# Patient Record
Sex: MALE | Race: BLACK OR AFRICAN AMERICAN | Employment: OTHER | URBAN - METROPOLITAN AREA
[De-identification: names, ages, dates, MRNs, and addresses within clinical notes are randomized per-mention and may not be internally consistent; named-entity substitution may affect disease eponyms.]

---

## 2017-07-18 ENCOUNTER — HOSPITAL ENCOUNTER (OUTPATIENT)
Dept: LAB | Age: 62
Discharge: HOME | End: 2017-07-18
Attending: INTERNAL MEDICINE
Payer: COMMERCIAL

## 2017-07-18 DIAGNOSIS — I50.22 SYSTOLIC CHF, CHRONIC (HCC): ICD-10-CM

## 2017-07-18 LAB
ANION GAP BLD CALC-SCNC: 9 MMOL/L
BASOPHILS # BLD AUTO: 0 K/UL (ref 0–0.2)
BASOPHILS # BLD: 0 % (ref 0–2)
BUN SERPL-MCNC: 23 MG/DL (ref 8–23)
CALCIUM SERPL-MCNC: 9.6 MG/DL (ref 8.3–10.4)
CHLORIDE SERPL-SCNC: 106 MMOL/L (ref 98–107)
CO2 SERPL-SCNC: 28 MMOL/L (ref 21–32)
CREAT SERPL-MCNC: 1.8 MG/DL (ref 0.8–1.5)
DIFFERENTIAL METHOD BLD: ABNORMAL
EOSINOPHIL # BLD: 0.1 K/UL (ref 0–0.8)
EOSINOPHIL NFR BLD: 2 % (ref 0.5–7.8)
ERYTHROCYTE [DISTWIDTH] IN BLOOD BY AUTOMATED COUNT: 12.9 % (ref 11.9–14.6)
EST. AVERAGE GLUCOSE BLD GHB EST-MCNC: 134 MG/DL
GLUCOSE SERPL-MCNC: 148 MG/DL (ref 65–100)
HBA1C MFR BLD: 6.3 % (ref 4.8–6)
HCT VFR BLD AUTO: 40.2 % (ref 41.1–50.3)
HGB BLD-MCNC: 14.2 G/DL (ref 13.6–17.2)
LYMPHOCYTES # BLD AUTO: 29 % (ref 13–44)
LYMPHOCYTES # BLD: 1.4 K/UL (ref 0.5–4.6)
MAGNESIUM SERPL-MCNC: 2.3 MG/DL (ref 1.8–2.4)
MCH RBC QN AUTO: 35.6 PG (ref 26.1–32.9)
MCHC RBC AUTO-ENTMCNC: 35.3 G/DL (ref 31.4–35)
MCV RBC AUTO: 100.8 FL (ref 79.6–97.8)
MONOCYTES # BLD: 0.5 K/UL (ref 0.1–1.3)
MONOCYTES NFR BLD AUTO: 11 % (ref 4–12)
NEUTS SEG # BLD: 2.8 K/UL (ref 1.7–8.2)
NEUTS SEG NFR BLD AUTO: 58 % (ref 43–78)
PLATELET # BLD AUTO: 133 K/UL (ref 150–450)
PMV BLD AUTO: 9.2 FL (ref 10.8–14.1)
POTASSIUM SERPL-SCNC: 3.7 MMOL/L (ref 3.5–5.1)
RBC # BLD AUTO: 3.99 M/UL (ref 4.23–5.67)
SODIUM SERPL-SCNC: 143 MMOL/L (ref 136–145)
WBC # BLD AUTO: 4.7 K/UL (ref 4.3–11.1)

## 2017-07-18 PROCEDURE — 85025 COMPLETE CBC W/AUTO DIFF WBC: CPT | Performed by: INTERNAL MEDICINE

## 2017-07-18 PROCEDURE — 83036 HEMOGLOBIN GLYCOSYLATED A1C: CPT | Performed by: INTERNAL MEDICINE

## 2017-07-18 PROCEDURE — 80048 BASIC METABOLIC PNL TOTAL CA: CPT | Performed by: INTERNAL MEDICINE

## 2017-07-18 PROCEDURE — 36415 COLL VENOUS BLD VENIPUNCTURE: CPT | Performed by: INTERNAL MEDICINE

## 2017-07-18 PROCEDURE — 83735 ASSAY OF MAGNESIUM: CPT | Performed by: INTERNAL MEDICINE

## 2017-07-24 ENCOUNTER — HOSPITAL ENCOUNTER (OUTPATIENT)
Dept: CARDIAC CATH/INVASIVE PROCEDURES | Age: 62
Discharge: HOME | End: 2017-07-24
Payer: COMMERCIAL

## 2017-07-24 ENCOUNTER — ANESTHESIA (OUTPATIENT)
Dept: SURGERY | Age: 62
End: 2017-07-24
Payer: COMMERCIAL

## 2017-07-24 ENCOUNTER — ANESTHESIA EVENT (OUTPATIENT)
Dept: SURGERY | Age: 62
End: 2017-07-24
Payer: COMMERCIAL

## 2017-07-24 ENCOUNTER — HOSPITAL ENCOUNTER (OUTPATIENT)
Age: 62
Setting detail: OUTPATIENT SURGERY
Discharge: HOME | End: 2017-07-24
Attending: INTERNAL MEDICINE | Admitting: INTERNAL MEDICINE
Payer: COMMERCIAL

## 2017-07-24 VITALS
TEMPERATURE: 97.6 F | BODY MASS INDEX: 29.49 KG/M2 | RESPIRATION RATE: 16 BRPM | OXYGEN SATURATION: 98 % | DIASTOLIC BLOOD PRESSURE: 94 MMHG | WEIGHT: 206 LBS | HEART RATE: 73 BPM | HEIGHT: 70 IN | SYSTOLIC BLOOD PRESSURE: 168 MMHG

## 2017-07-24 LAB
ALBUMIN SERPL BCP-MCNC: 3.8 G/DL (ref 3.2–4.6)
ALBUMIN/GLOB SERPL: 1 {RATIO} (ref 1.2–3.5)
ALP SERPL-CCNC: 52 U/L (ref 50–136)
ALT SERPL-CCNC: 36 U/L (ref 12–65)
ANION GAP BLD CALC-SCNC: 7 MMOL/L (ref 7–16)
AST SERPL W P-5'-P-CCNC: 48 U/L (ref 15–37)
ATRIAL RATE: 73 BPM
BASOPHILS # BLD AUTO: 0 K/UL (ref 0–0.2)
BASOPHILS # BLD: 0 % (ref 0–2)
BILIRUB SERPL-MCNC: 0.5 MG/DL (ref 0.2–1.1)
BUN SERPL-MCNC: 17 MG/DL (ref 8–23)
CALCIUM SERPL-MCNC: 9 MG/DL (ref 8.3–10.4)
CALCULATED P AXIS, ECG09: 65 DEGREES
CALCULATED R AXIS, ECG10: 25 DEGREES
CALCULATED T AXIS, ECG11: 39 DEGREES
CHLORIDE SERPL-SCNC: 106 MMOL/L (ref 98–107)
CO2 SERPL-SCNC: 28 MMOL/L (ref 21–32)
CREAT SERPL-MCNC: 1.38 MG/DL (ref 0.8–1.5)
DIAGNOSIS, 93000: NORMAL
DIFFERENTIAL METHOD BLD: ABNORMAL
EOSINOPHIL # BLD: 0.1 K/UL (ref 0–0.8)
EOSINOPHIL NFR BLD: 2 % (ref 0.5–7.8)
ERYTHROCYTE [DISTWIDTH] IN BLOOD BY AUTOMATED COUNT: 13.6 % (ref 11.9–14.6)
GLOBULIN SER CALC-MCNC: 3.9 G/DL (ref 2.3–3.5)
GLUCOSE SERPL-MCNC: 128 MG/DL (ref 65–100)
HCT VFR BLD AUTO: 38.6 % (ref 41.1–50.3)
HGB BLD-MCNC: 13.9 G/DL (ref 13.6–17.2)
IMM GRANULOCYTES # BLD: 0 K/UL (ref 0–0.5)
IMM GRANULOCYTES NFR BLD AUTO: 0.5 % (ref 0–5)
LYMPHOCYTES # BLD AUTO: 30 % (ref 13–44)
LYMPHOCYTES # BLD: 1.8 K/UL (ref 0.5–4.6)
MAGNESIUM SERPL-MCNC: 2.2 MG/DL (ref 1.8–2.4)
MCH RBC QN AUTO: 34.8 PG (ref 26.1–32.9)
MCHC RBC AUTO-ENTMCNC: 36 G/DL (ref 31.4–35)
MCV RBC AUTO: 96.5 FL (ref 79.6–97.8)
MONOCYTES # BLD: 0.5 K/UL (ref 0.1–1.3)
MONOCYTES NFR BLD AUTO: 8 % (ref 4–12)
NEUTS SEG # BLD: 3.6 K/UL (ref 1.7–8.2)
NEUTS SEG NFR BLD AUTO: 60 % (ref 43–78)
P-R INTERVAL, ECG05: 172 MS
PLATELET # BLD AUTO: 128 K/UL (ref 150–450)
PMV BLD AUTO: 9 FL (ref 10.8–14.1)
POTASSIUM SERPL-SCNC: 4.1 MMOL/L (ref 3.5–5.1)
PROT SERPL-MCNC: 7.7 G/DL (ref 6.3–8.2)
Q-T INTERVAL, ECG07: 426 MS
QRS DURATION, ECG06: 98 MS
QTC CALCULATION (BEZET), ECG08: 469 MS
RBC # BLD AUTO: 4 M/UL (ref 4.23–5.67)
SODIUM SERPL-SCNC: 141 MMOL/L (ref 136–145)
VENTRICULAR RATE, ECG03: 73 BPM
WBC # BLD AUTO: 6.1 K/UL (ref 4.3–11.1)

## 2017-07-24 PROCEDURE — 83735 ASSAY OF MAGNESIUM: CPT | Performed by: INTERNAL MEDICINE

## 2017-07-24 PROCEDURE — 74011000272 HC RX REV CODE- 272: Performed by: INTERNAL MEDICINE

## 2017-07-24 PROCEDURE — 76060000033 HC ANESTHESIA 1 TO 1.5 HR: Performed by: INTERNAL MEDICINE

## 2017-07-24 PROCEDURE — 85025 COMPLETE CBC W/AUTO DIFF WBC: CPT | Performed by: INTERNAL MEDICINE

## 2017-07-24 PROCEDURE — 77030019557 HC ELECTRD VES SEAL MEDT -F

## 2017-07-24 PROCEDURE — 93005 ELECTROCARDIOGRAM TRACING: CPT | Performed by: INTERNAL MEDICINE

## 2017-07-24 PROCEDURE — 77030012935 HC DRSG AQUACEL BMS -B

## 2017-07-24 PROCEDURE — 74011250636 HC RX REV CODE- 250/636: Performed by: INTERNAL MEDICINE

## 2017-07-24 PROCEDURE — 77030031139 HC SUT VCRL2 J&J -A

## 2017-07-24 PROCEDURE — 74011000250 HC RX REV CODE- 250

## 2017-07-24 PROCEDURE — 74011000250 HC RX REV CODE- 250: Performed by: INTERNAL MEDICINE

## 2017-07-24 PROCEDURE — 80053 COMPREHEN METABOLIC PANEL: CPT | Performed by: INTERNAL MEDICINE

## 2017-07-24 PROCEDURE — C1721 AICD, DUAL CHAMBER: HCPCS

## 2017-07-24 PROCEDURE — 77030008467 HC STPLR SKN COVD -B

## 2017-07-24 PROCEDURE — 74011250636 HC RX REV CODE- 250/636

## 2017-07-24 PROCEDURE — 77030028698 HC BLD TISS PLSM MEDT -D

## 2017-07-24 PROCEDURE — 33263 RMVL & RPLCMT DFB GEN 2 LEAD: CPT

## 2017-07-24 RX ORDER — PROPOFOL 10 MG/ML
INJECTION, EMULSION INTRAVENOUS
Status: DISCONTINUED | OUTPATIENT
Start: 2017-07-24 | End: 2017-07-24 | Stop reason: HOSPADM

## 2017-07-24 RX ORDER — SODIUM CHLORIDE, SODIUM LACTATE, POTASSIUM CHLORIDE, CALCIUM CHLORIDE 600; 310; 30; 20 MG/100ML; MG/100ML; MG/100ML; MG/100ML
INJECTION, SOLUTION INTRAVENOUS
Status: DISCONTINUED | OUTPATIENT
Start: 2017-07-24 | End: 2017-07-24 | Stop reason: HOSPADM

## 2017-07-24 RX ORDER — CEFAZOLIN SODIUM IN 0.9 % NACL 2 G/50 ML
2 INTRAVENOUS SOLUTION, PIGGYBACK (ML) INTRAVENOUS ONCE
Status: COMPLETED | OUTPATIENT
Start: 2017-07-24 | End: 2017-07-24

## 2017-07-24 RX ORDER — MIDAZOLAM HYDROCHLORIDE 1 MG/ML
INJECTION, SOLUTION INTRAMUSCULAR; INTRAVENOUS AS NEEDED
Status: DISCONTINUED | OUTPATIENT
Start: 2017-07-24 | End: 2017-07-24 | Stop reason: HOSPADM

## 2017-07-24 RX ORDER — PROPOFOL 10 MG/ML
INJECTION, EMULSION INTRAVENOUS AS NEEDED
Status: DISCONTINUED | OUTPATIENT
Start: 2017-07-24 | End: 2017-07-24 | Stop reason: HOSPADM

## 2017-07-24 RX ORDER — FENTANYL CITRATE 50 UG/ML
INJECTION, SOLUTION INTRAMUSCULAR; INTRAVENOUS AS NEEDED
Status: DISCONTINUED | OUTPATIENT
Start: 2017-07-24 | End: 2017-07-24 | Stop reason: HOSPADM

## 2017-07-24 RX ORDER — BUPIVACAINE HYDROCHLORIDE AND EPINEPHRINE 5; 5 MG/ML; UG/ML
60 INJECTION, SOLUTION EPIDURAL; INTRACAUDAL; PERINEURAL ONCE
Status: COMPLETED | OUTPATIENT
Start: 2017-07-24 | End: 2017-07-24

## 2017-07-24 RX ORDER — LIDOCAINE HYDROCHLORIDE 20 MG/ML
INJECTION, SOLUTION EPIDURAL; INFILTRATION; INTRACAUDAL; PERINEURAL AS NEEDED
Status: DISCONTINUED | OUTPATIENT
Start: 2017-07-24 | End: 2017-07-24 | Stop reason: HOSPADM

## 2017-07-24 RX ADMIN — BACITRACIN: 50000 INJECTION, POWDER, FOR SOLUTION INTRAMUSCULAR at 11:00

## 2017-07-24 RX ADMIN — SODIUM CHLORIDE, SODIUM LACTATE, POTASSIUM CHLORIDE, CALCIUM CHLORIDE: 600; 310; 30; 20 INJECTION, SOLUTION INTRAVENOUS at 11:14

## 2017-07-24 RX ADMIN — FENTANYL CITRATE 25 MCG: 50 INJECTION, SOLUTION INTRAMUSCULAR; INTRAVENOUS at 12:12

## 2017-07-24 RX ADMIN — PROPOFOL 20 MG: 10 INJECTION, EMULSION INTRAVENOUS at 11:34

## 2017-07-24 RX ADMIN — FENTANYL CITRATE 25 MCG: 50 INJECTION, SOLUTION INTRAMUSCULAR; INTRAVENOUS at 12:16

## 2017-07-24 RX ADMIN — PROPOFOL 20 MG: 10 INJECTION, EMULSION INTRAVENOUS at 11:22

## 2017-07-24 RX ADMIN — FENTANYL CITRATE 25 MCG: 50 INJECTION, SOLUTION INTRAMUSCULAR; INTRAVENOUS at 12:02

## 2017-07-24 RX ADMIN — MIDAZOLAM HYDROCHLORIDE 2 MG: 1 INJECTION, SOLUTION INTRAMUSCULAR; INTRAVENOUS at 11:21

## 2017-07-24 RX ADMIN — CEFAZOLIN 2 G: 1 INJECTION, POWDER, FOR SOLUTION INTRAMUSCULAR; INTRAVENOUS; PARENTERAL at 14:03

## 2017-07-24 RX ADMIN — LIDOCAINE HYDROCHLORIDE 60 MG: 20 INJECTION, SOLUTION EPIDURAL; INFILTRATION; INTRACAUDAL; PERINEURAL at 11:42

## 2017-07-24 RX ADMIN — BUPIVACAINE HYDROCHLORIDE AND EPINEPHRINE 150 MG: 5; 5 INJECTION, SOLUTION EPIDURAL; INTRACAUDAL; PERINEURAL at 11:00

## 2017-07-24 RX ADMIN — PROPOFOL 100 MCG/KG/MIN: 10 INJECTION, EMULSION INTRAVENOUS at 11:21

## 2017-07-24 RX ADMIN — PROPOFOL 10 MG: 10 INJECTION, EMULSION INTRAVENOUS at 11:37

## 2017-07-24 RX ADMIN — CEFAZOLIN 2 G: 1 INJECTION, POWDER, FOR SOLUTION INTRAMUSCULAR; INTRAVENOUS; PARENTERAL at 11:25

## 2017-07-24 RX ADMIN — FENTANYL CITRATE 25 MCG: 50 INJECTION, SOLUTION INTRAMUSCULAR; INTRAVENOUS at 11:56

## 2017-07-24 NOTE — ANESTHESIA PREPROCEDURE EVALUATION
Anesthetic History   No history of anesthetic complications            Review of Systems / Medical History  Patient summary reviewed, nursing notes reviewed and pertinent labs reviewed    Pulmonary          Smoker         Neuro/Psych         TIA    Comments: Micturation syncope Cardiovascular    Hypertension        Dysrhythmias   Pacemaker (Aicd for recurrent VT.   DDDR), past MI and CAD    Exercise tolerance: >4 METS  Comments: EF 30% 2017   GI/Hepatic/Renal     GERD           Endo/Other    Diabetes: type 2    Obesity    Comments: Attempting diet control for his diabetes Other Findings            Physical Exam    Airway  Mallampati: I  TM Distance: 4 - 6 cm  Neck ROM: normal range of motion   Mouth opening: Normal     Cardiovascular    Rhythm: regular           Dental    Dentition: Upper partial plate     Pulmonary  Breath sounds clear to auscultation               Abdominal  GI exam deferred       Other Findings            Anesthetic Plan    ASA: 3  Anesthesia type: total IV anesthesia          Induction: Intravenous  Anesthetic plan and risks discussed with: Patient

## 2017-07-24 NOTE — IP AVS SNAPSHOT
[address] 
[phone] Patient: Yousuf Hart MRN: [id number] :1955 Discharge Summary 2017 Yousuf Hart MRN[de-identified]  [id number] Admission Information Provider Pager Service Admission Date Expected D/C Date Inderjit Arambula MD  CARDIAC CATH LAB 2017 Actual LOS Patient Class 0 days OUTPATIENT SURGERY Follow-up Information Follow up With Details Comments Contact Info Yasmeen Craft MD    [address] [facility] [address] 
[phone] Current Discharge Medication List  
  
CONTINUE these medications which have NOT CHANGED Dose & Instructions Dispensing Information Comments Morning Noon Evening Bedtime  
 aspirin 81 mg tablet Your last dose was: Your next dose is:    
   
   
 Dose:  81 mg Take 81 mg by mouth daily. Refills:  0  
     
   
   
   
  
 carvedilol 12.5 mg tablet Commonly known as:  Elizabeth Shade Your last dose was: Your next dose is:    
   
   
 Dose:  12.5 mg Take 1 Tab by mouth two (2) times daily (with meals). Quantity:  60 Tab Refills:  11 LaMICtal 25 mg tablet Generic drug:  lamoTRIgine Your last dose was: Your next dose is: Take  by mouth daily. Refills:  0  
     
   
   
   
  
 losartan-hydroCHLOROthiazide 100-12.5 mg per tablet Commonly known as:  HYZAAR Your last dose was: Your next dose is:    
   
   
 Dose:  1 Tab Take 1 Tab by mouth daily. Refills:  0  
     
   
   
   
  
 multivitamin tablet Commonly known as:  ONE A DAY Your last dose was: Your next dose is:    
   
   
 Dose:  1 Tab Take 1 Tab by mouth daily. Refills:  0  
     
   
   
   
  
 pantoprazole 40 mg tablet Commonly known as:  PROTONIX Your last dose was: Your next dose is: Dose:  40 mg Take 40 mg by mouth daily. Refills:  0  
     
   
   
   
  
 pravastatin 80 mg tablet Commonly known as:  PRAVACHOL Your last dose was: Your next dose is: TAKE 1 TABLET (80 MG) BY MOUTH NIGHTLY. Refills:  3 General Information Please provide this summary of care documentation to your next provider. Allergies Unspecified:  Other Medication Current Immunizations  Never Reviewed No immunizations on file. Discharge Instructions Discharge Instructions Pacemaker Battery Change Out Follow-up appointment in the [address] [place] [address] on August 4th @ 1:30pm. Dr. Tameka Curran will see you while you are there. Keep your incision dry for 14 days. DO NOT put any lotions, creams, powders, ointments over your incision for 2 weeks. You may remove your bandage after 3 days. If you have strips of tape over your incision please DO NOT remove them. These will fall off on their own. If you have staples or stitches these will be removed at your follow-up appointment. If your incision becomes very red, swollen, there is drainage coming out of the incision, tender, or you have a fever greater than 101 please contact your doctor immediately. You may use your pacemaker arm but DO NOT raise the arm higher than your shoulder for the first 2 weeks so that your incision can heal. 
 
DO NOT lift more than 5-10 pounds for 2 weeks and 20 pounds for one month. DO NOT push or pull with the pacemaker arm for 2 weeks. Microwaves will not harm your pacemaker. Remember to keep your pacemaker card with you at all times. Within 6 weeks you should receive your permanent card. Keep your temporary card as a spare. If you do not receive your permanent card or lose your card please contact your doctor. At airports, always show your pacemaker card.  You may walk through the metal detectors, but DO NOT allow the hand held wand near your pacemaker. Be sure to talk with your doctor before having an MRI. If you need to change the follow up appointment that has been made for you please contact your doctor's office. Discharge Orders None  
  
` Patient Signature:  ____________________________________________________________ Date:  ____________________________________________________________  
  
 Rexann Ports Provider Signature:  ____________________________________________________________ Date:  ____________________________________________________________

## 2017-07-24 NOTE — PROCEDURES
Pre-Procedure Diagnosis  1. Chronic systolic heart failure, ejection fraction of 30%  2. Non ischemic dilated cardiomyopathy. 3. ICD generator LOULOU  4. Ventricular tachycardia    Procedure Performed  1. Dual Chamber ICD Gen Change    Anesthesia: MAC     Estimated Blood Loss: Less than 10 mL     Specimens: * No specimens in log *     Patient Information and Indications: The procedure, indications, risks, benefits, and alternatives were discussed with the patient and family members, who desired to proceed after questions were answered and informed consent was documented. Procedure: After informed consent was obtained, the patient was brought to the Electrophysiology Laboratory in a fasting state and was prepped and draped in sterile fashion. Prophylactic antibiotic was administered prior to skin incision: (Ancef 2 gm). Conscious sedation was administered with continuous oxygen saturation measurement and blood pressure measurement by Anesthesia. Local anesthetic (sensorcaine) was delivered to the left pectoral region and an incision was made directly over the prior surgical scar. The subcutaneous pocket was opened using blunt dissection and electrocautery, and adequate hemostasis was established. The device was freed from overlying fibrotic tissue and the leads freed to give enough slack for device exchange. The leads were individually removed from the old generator and tested using the PSA revealing excellent pacing parameters. The lead pins were then cleaned with antibiotic soaked gauze, dried gently, and attached to a new ICD generator. Pins were directly observed to pass the tip electrode, and the ring hex wrench screws were secured, and leads tug tested. The device and leads were gently positioned within the pocket. The pocket was irrigated copiously with a saline antimicrobial solution prior to device positioning. The device and leads were tested a second time prior to pocket closure.   The wound was closed with multiple layers of absorbable suture followed by skin closure staples. The patient tolerated the procedure well and left the lab in good condition. Complications: None     Pulse Generator Model #  Serial # Location Implant   [id number]      [id number] David MARCOS MDT [id number]      [id number] Left Pectoral      Left Pectoral Implant 10-01-09      Implant 7-24-17       Lead Model Number  Serial Number Lead position Implant   RA [id number]        MDT [id number] RA 10-01-09   RV [id number] MDT [id number] RV [place] Implant  10-01-09     Lead Sensitivity and Threshold  Lead R or P sensitivity (mv) Threshold (V) Threshold PW (msec) Impedance (ohms) Final output Voltage (V) Final PW (msec)   RA 3.8      .5 . 5 304 1.75 .5   RV 12.6      .5 .5 456 53/52 2.0 .5     Bradycardia Settings  Bebeto Mode LRL URL Pace AVD (ms) Sense AVD (ms) Rate Response Mode Switching Mode SW Rate   DDDR 70 120 190 160 On On 171       Tachycardia Settings  Zone Type VT-1 VT-2 VF   ON/OFF/  MONITOR On  On   Zone Rate 188  231   1st Therapy Type Burst 81%  Shock   Energy (J)   35   2nd Therapy Type CV  Shock   Energy (J) 20  35   3rd Therapy Type CV  Shock   Energy (J) 35  35   4th Therapy Type CV  Shock   Energy (J) 35  35   5th Therapy Type CV  Shock   Energy (J)             35  35   6th Therapy Type CV  Shock   Energy (J) 35  35     Defibrillation Threshold Testing  DFT# How induced Successful test? Shock Imped (ohms) Energy (J) Charge time (sec) Rescue needed? Defib threshold (J)   n/a            Contrast: none    Fluoro Time:   none    Impression: 1) Successful Medtronic dual chamber ICD generator replacement. Gretchen Abbott MD, MS  Clinical Cardiac Electrophysiology  7/24/2017  12:15 PM

## 2017-07-24 NOTE — DISCHARGE INSTRUCTIONS
Pacemaker Battery Change Out    Follow-up appointment in the [address] [place] [address] on August 4th @ 1:30pm. Dr. Jolie Flowers will see you while you are there. Keep your incision dry for 14 days. DO NOT put any lotions, creams, powders, ointments over your incision for 2 weeks. You may remove your bandage after 3 days. If you have strips of tape over your incision please DO NOT remove them. These will fall off on their own. If you have staples or stitches these will be removed at your follow-up appointment. If your incision becomes very red, swollen, there is drainage coming out of the incision, tender, or you have a fever greater than 101 please contact your doctor immediately. You may use your pacemaker arm but DO NOT raise the arm higher than your shoulder for the first 2 weeks so that your incision can heal.    DO NOT lift more than 5-10 pounds for 2 weeks and 20 pounds for one month. DO NOT push or pull with the pacemaker arm for 2 weeks. Microwaves will not harm your pacemaker. Remember to keep your pacemaker card with you at all times. Within 6 weeks you should receive your permanent card. Keep your temporary card as a spare. If you do not receive your permanent card or lose your card please contact your doctor. At airports, always show your pacemaker card. You may walk through the metal detectors, but DO NOT allow the hand held wand near your pacemaker. Be sure to talk with your doctor before having an MRI. If you need to change the follow up appointment that has been made for you please contact your doctor's office.

## 2017-07-24 NOTE — ANESTHESIA POSTPROCEDURE EVALUATION
Post-Anesthesia Evaluation and Assessment    Patient: Silvia Wolfe MRN: [id number]  SSN: [id number]    YOB: 1955  Age: 62 y.o. Sex: male       Cardiovascular Function/Vital Signs  Visit Vitals    BP (!) 180/93 (BP 1 Location: Right arm, BP Patient Position: At rest)    Pulse 73    Temp 36.4 °C (97.6 °F)    Resp 14    Ht 5' 10\" (1.778 m)    Wt 93.4 kg (206 lb)    SpO2 99%    BMI 29.56 kg/m2       Patient is status post total IV anesthesia anesthesia for Procedure(s):  ICD GENERATOR CHANGE. Nausea/Vomiting: None    Postoperative hydration reviewed and adequate. Pain:  Pain Scale 1: Numeric (0 - 10) (07/24/17 1359)  Pain Intensity 1: 0 (07/24/17 1359)   Managed    Neurological Status: At baseline    Mental Status and Level of Consciousness: Arousable    Pulmonary Status:   O2 Device: Room air (07/24/17 1359)   Adequate oxygenation and airway patent    Complications related to anesthesia: None    Post-anesthesia assessment completed.  No concerns    Signed By: Orville Barfield MD     July 24, 2017

## 2017-12-27 PROBLEM — I42.8 NICM (NONISCHEMIC CARDIOMYOPATHY) (HCC): Status: ACTIVE | Noted: 2017-12-27

## 2020-07-20 ENCOUNTER — HOSPITAL ENCOUNTER (OUTPATIENT)
Dept: LAB | Age: 65
Discharge: HOME | End: 2020-07-20
Attending: FAMILY MEDICINE
Payer: MEDICARE

## 2020-07-20 DIAGNOSIS — I50.22 SYSTOLIC CHF, CHRONIC (HCC): ICD-10-CM

## 2020-07-20 DIAGNOSIS — R73.03 DIABETES MELLITUS, LATENT: ICD-10-CM

## 2020-07-20 DIAGNOSIS — R06.02 SOB (SHORTNESS OF BREATH): ICD-10-CM

## 2020-07-20 DIAGNOSIS — I48.0 PAROXYSMAL ATRIAL FIBRILLATION (HCC): ICD-10-CM

## 2020-07-20 DIAGNOSIS — D64.9 ANEMIA, UNSPECIFIED TYPE: ICD-10-CM

## 2020-07-20 DIAGNOSIS — E78.2 MIXED HYPERLIPIDEMIA: ICD-10-CM

## 2020-07-20 LAB
ALBUMIN SERPL-MCNC: 4 G/DL (ref 3.2–4.6)
ALBUMIN/GLOB SERPL: 1.1 {RATIO} (ref 1.2–3.5)
ALP SERPL-CCNC: 57 U/L (ref 50–136)
ALT SERPL-CCNC: 23 U/L (ref 12–65)
ANION GAP SERPL CALC-SCNC: 4 MMOL/L (ref 7–16)
AST SERPL-CCNC: 17 U/L (ref 15–37)
BILIRUB SERPL-MCNC: 0.4 MG/DL (ref 0.2–1.1)
BNP SERPL-MCNC: 138 PG/ML (ref 5–125)
BUN SERPL-MCNC: 20 MG/DL (ref 8–23)
CALCIUM SERPL-MCNC: 9.1 MG/DL (ref 8.3–10.4)
CHLORIDE SERPL-SCNC: 112 MMOL/L (ref 98–107)
CHOLEST SERPL-MCNC: 156 MG/DL
CO2 SERPL-SCNC: 28 MMOL/L (ref 21–32)
CREAT SERPL-MCNC: 1.09 MG/DL (ref 0.8–1.5)
ERYTHROCYTE [DISTWIDTH] IN BLOOD BY AUTOMATED COUNT: 14 % (ref 11.9–14.6)
EST. AVERAGE GLUCOSE BLD GHB EST-MCNC: 146 MG/DL
FERRITIN SERPL-MCNC: 218 NG/ML (ref 8–388)
GLOBULIN SER CALC-MCNC: 3.6 G/DL (ref 2.3–3.5)
GLUCOSE SERPL-MCNC: 111 MG/DL (ref 65–100)
HBA1C MFR BLD: 6.7 % (ref 4.8–6)
HCT VFR BLD AUTO: 44.1 % (ref 41.1–50.3)
HDLC SERPL-MCNC: 40 MG/DL (ref 40–60)
HDLC SERPL: 3.9 {RATIO}
HGB BLD-MCNC: 14.8 G/DL (ref 13.6–17.2)
IRON SATN MFR SERPL: 24 %
IRON SERPL-MCNC: 61 UG/DL (ref 35–150)
LDLC SERPL CALC-MCNC: 101 MG/DL
LIPID PROFILE,FLP: ABNORMAL
MCH RBC QN AUTO: 32.7 PG (ref 26.1–32.9)
MCHC RBC AUTO-ENTMCNC: 33.6 G/DL (ref 31.4–35)
MCV RBC AUTO: 97.6 FL (ref 79.6–97.8)
NRBC # BLD: 0 K/UL (ref 0–0.2)
PLATELET # BLD AUTO: 148 K/UL (ref 150–450)
PMV BLD AUTO: 9.4 FL (ref 9.4–12.3)
POTASSIUM SERPL-SCNC: 4 MMOL/L (ref 3.5–5.1)
PROT SERPL-MCNC: 7.6 G/DL (ref 6.3–8.2)
RBC # BLD AUTO: 4.52 M/UL (ref 4.23–5.6)
SODIUM SERPL-SCNC: 144 MMOL/L (ref 136–145)
TIBC SERPL-MCNC: 256 UG/DL (ref 250–450)
TRIGL SERPL-MCNC: 75 MG/DL (ref 35–150)
TSH SERPL DL<=0.005 MIU/L-ACNC: 1.3 UIU/ML (ref 0.36–3.74)
VLDLC SERPL CALC-MCNC: 15 MG/DL (ref 6–23)
WBC # BLD AUTO: 5.5 K/UL (ref 4.3–11.1)

## 2020-07-20 PROCEDURE — 83036 HEMOGLOBIN GLYCOSYLATED A1C: CPT

## 2020-07-20 PROCEDURE — 84443 ASSAY THYROID STIM HORMONE: CPT

## 2020-07-20 PROCEDURE — 82728 ASSAY OF FERRITIN: CPT

## 2020-07-20 PROCEDURE — 83880 ASSAY OF NATRIURETIC PEPTIDE: CPT

## 2020-07-20 PROCEDURE — 85027 COMPLETE CBC AUTOMATED: CPT

## 2020-07-20 PROCEDURE — 80053 COMPREHEN METABOLIC PANEL: CPT

## 2020-07-20 PROCEDURE — 36415 COLL VENOUS BLD VENIPUNCTURE: CPT

## 2020-07-20 PROCEDURE — 80061 LIPID PANEL: CPT

## 2020-07-20 PROCEDURE — 83540 ASSAY OF IRON: CPT

## 2020-07-30 ENCOUNTER — HOSPITAL ENCOUNTER (OUTPATIENT)
Dept: PHYSICAL THERAPY | Age: 65
Discharge: HOME | End: 2020-07-30
Payer: MEDICARE

## 2020-07-30 DIAGNOSIS — G89.4 CHRONIC PAIN SYNDROME: ICD-10-CM

## 2020-07-30 PROCEDURE — 97161 PT EVAL LOW COMPLEX 20 MIN: CPT

## 2020-07-30 NOTE — PROGRESS NOTES
Shruthi Do  : 1955  Pay[place] / [place] / Product Type: Managed Care Medicare /  [address] [facility]. [facility], [address] [place] [address], [facility], [address] [place] [address]  [phone]   [phone]                                                          Jenn Rosario MD      OUTPATIENT PHYSICAL THERAPY: Daily Treatment Note 2020 Visit Count:  1    Tx Diagnosis   Low back pain (M54.5)  Muscle Weakness, Generalized (M62.81)  Muscle spasm of back (M62.830)  Abnormal posture (R29.3)        Pre-treatment Symptoms/Complaints:  See Initial Eval Dated 20 for more details. Pain: Initial:6/10 Post Session: 7/10   Medications Last Reviewed:  2020     Updated Objective Findings: See Initial Eval for more details. TREATMENT:   THERAPEUTIC EXERCISE: (unbillable  minutes):  Exercises per grid below to improve mobility, strength and balance. Required minimal visual, verbal and manual cues to promote proper body alignment and promote proper body posture. Progressed resistance and complexity of movement as indicated. Date:  2020 Date:   Date:     Activity/Exercise Parameters Parameters Parameters   Education HEP, POC, PT goals, anatomy/pathology     LTR NV     SKTC NV                                   MANUAL THERAPY: (0 minutes): Joint mobilization, Soft tissue mobilization was utilized and necessary because of the patient's restricted joint motion and restricted motion of soft tissue mobility. Date  2020    Technique Used Grade  Level # Time(s) Effect while being performed                                                          [facility]  Treatment/Session Summary:    Response to Treatment: Evaluation tolerated well.    Communication/Consultation:  POC, HEP, PT goals, Faxed initial evaluation to MD.   Equipment provided today: HEP Handout     Recommendations/Intent for next treatment session:   Next visit will focus on Manual Therapy Core Stability Pain Science Education soft tissue mobilization Exercise. Treatment Plan of Care Effective Dates: 7/30/2020 TO 10/28/2020 (90 days).   Frequency/Duration: 2 times a week for 90 Days             Total Treatment Billable Duration:   0 minutes  Rx plus Eval   PT Patient Time In/Time Out  Time In: 1350  Time Out: [address] Marisela Pouch, DPT    Future Appointments   Date Time Provider Taj Johnson   9/24/2020  8:00 AM DO RAZIA Fields [facility]   9/24/2020  8:30 AM G[facility]LE DEVICE Najma Chaney [address]

## 2020-07-30 NOTE — THERAPY EVALUATION
Arun Santos : 1955 Payor: [facility] [place] MEDICARE / Plan: [facility] MEDICARE HMO/PPO / Product Type: Managed Care Medicare /  [id number] [facility] [address] [place] [address], [address] [place] [address], [id number] [place] [address] [phone]   [phone] OUTPATIENT PHYSICAL THERAPY:Initial Assessment 2020 ICD-10: Treatment Diagnosis: Low back pain (M54.5) Muscle Weakness, Generalized (M62.81) Muscle spasm of back (M62.830) Abnormal posture (R29.3) Precautions/Allergies: Other medication Fall Risk Score: 1 (? 5 = High Risk) MD Orders: Eval and Treat  MEDICAL/REFERRING DIAGNOSIS: 
Chronic pain syndrome [G89.4] DATE OF ONSET: *2020 REFERRING PHYSICIAN: Kenzie Raza MD 
RETURN PHYSICIAN APPOINTMENT: 2 weeks or so. INITIAL ASSESSMENT:  Mr. Arun Santos presents to physical therapy with decreased postural, ROM, joint mobility, flexibility, functional mobility, and increased pain. No pelvic malalignment upon initial evaluation but decreased posture. These S/S are consistent with possible mm strain. He has overall great strength---poor lumbar extension mobility. I do not feel there is any disc involvement/stenosis. With Health system his pain completed dissipated in supine position (?). Will progress per presentation. Arun Santos will benefit from skilled physical therapy (medically necessary) to address above deficits affecting participation in basic ADLs and functional mobility/tolerance. Patient will benefit from manual therapeutic techniques (stretching, joint mobilizations, soft tissue mobilization/myofascial release), therapeutic exercises and activities, postural strengthening/education, and comprehensive home exercises program to address current impairments and functional limitations. PROBLEM LIST (Impacting functional limitations): 1. Decreased Strength 2. Decreased ADL/Functional Activities 3. Decreased Transfer Abilities 4. Decreased Ambulation Ability/Technique 5. Decreased Balance 6. Increased Pain 7. Decreased Activity Tolerance 8. Increased Fatigue 9. Increased Shortness of Breath 10. Decreased Flexibility/Joint Mobility 11. Decreased [place] with Home Exercise Program INTERVENTIONS PLANNED: 
1. Balance Exercise 2. Bed Mobility 3. Cold 4. Cryotherapy 5. Electrical Stimulation 6. Family Education 7. Gait Training 8. Heat 9. Home Exercise Program (HEP) 10. Manual Therapy 11. Neuromuscular Re-education/Strengthening 12. Range of Motion (ROM) 13. Therapeutic Activites 14. Therapeutic Exercise/Strengthening 15. Transfer Training 16. Lumbar Traction TREATMENT PLAN: 
Effective Dates: 7/30/2020 TO 10/28/2020 (90 days). Frequency/Duration: 2 times a week for 90 Days GOALS: (Goals have been discussed and agreed upon with patient.) Short-Term Goals~4 weeks  Goal Met 1. David Ferrell will be independent with HEP 1.  [] Date: 2. Juan Carlos Do will participate in LE stretching program to increase flexibility    2. [] Date: 3. Juan Carlos Do will participate in core stabilization exercises to help with stabilization during ADLs 3. [] Date:  
4. Juan Carlos Do will participate in LE strengthening program with weights/resistance as appropriate to help with gait and elevations 4. [] Date:  
5. Juan Carlos Do will participate in static and dynamic balance activities to decrease the risk for falls     5. [] Date: 6. Juan Carlos Do will tolerate manual therapy/joint mobilizations/soft tissue to increase ROM and decrease pain  6. [] Date:  
    
    
 Long Term Goals~8 weeks Goal Met 1. David Ferrell will demonstrate a 19 point improvement on the Oswestry to show improvement in function 1. [] Date: 2. Juan Carlos Do will report 0/10 pain at rest and during ADLs  2. [] Date: 3. Juan Carlos Do will demonstrate 5/5 LE strength on manual muscle testing 3. [] Date: 4. Juan Carlos Do will be able to perform SLS >5 seconds bilaterally to help with gait and improve balance 4. [] Date: Outcome Measure: Tool Used: Modified Oswestry Low Back Pain Questionnaire Score:  Initial: 19/50  Most Recent: X/50 (Date: -- ) Interpretation of Score: Each section is scored on a 0-5 scale, 5 representing the greatest disability. The scores of each section are added together for a total score of 50. Medical Necessity:  
· Skilled intervention continues to be required due to above deficits affecting participation in basic ADLs and overall functional tolerance. Reason for Services/Other Comments: 
· Patient continues to require skilled intervention due to  above deficits affecting participation in basic ADLs and overall functional tolerance. Total Treatment Duration: PT Patient Time In/Time Out Time In: 1350 Time Out: 1430 Rehabilitation Potential For Stated Goals: GOOD Regarding Juan Carlos Do's therapy, I certify that the treatment plan above will be carried out by a therapist or under their direction. Thank you for this referral, 
Armando Austin DPT Referring Physician Signature: Kobe Salas MD              Date HISTORY:  
History of Present Injury/Illness (Reason for Referral): I just woke up and had pain. I have no idea what caused it. It's a 7 now but if I go get on my feet that's where my problem is.   
 
-Present symptoms/complaints (on day of evaluation) Pain Scale: · Current: 6/10 · Best: 4/10 · Worst: 10/10 · Aggravating factors: Walking and bending · Relieving factors: sleeping · Irritability: Medium (Onset of pain is equal to alleviation) Past Medical History/Comorbidities:  
Mr. Esdras Vegas  has a past medical history of AICD (automatic cardioverter/defibrillator) present (11/17/2015), Anemia, Arrhythmia (Sept. 2009), CAD (coronary artery disease) (2010), CAD (coronary artery disease), Chronic combined systolic and diastolic CHF (congestive heart failure) ([address] (11/17/2015), Chronic obstructive pulmonary disease ([address] Chronic pain, Coronary atherosclerosis of native coronary vessel (11/17/2015), Depression, Diabetes ([address] (2011), Dyslipidemia, ED (erectile dysfunction) (11/17/2015), GERD (gastroesophageal reflux disease), Heart failure ([address] HLD (hyperlipidemia) (11/17/2015), HTN (hypertension), benign (11/17/2015), Hypertension (x 3 yrs), Morbid obesity ([address] Paroxysmal atrial fibrillation ([address] (11/17/2015), Paroxysmal VT ([address] (11/17/2015), Psychiatric disorder, Syncope, Syncope and collapse (53/87/9402), Systolic CHF, chronic ([address] (11/17/2015), Tobacco abuse disorder (11/17/2015), and Type II diabetes mellitus, uncontrolled ([address] (11/17/2015). He also has no past medical history of Arthritis, Asthma, Autoimmune disease ([address] Calculus of kidney, Cancer ([address] Chronic kidney disease, Contact dermatitis and eczema due to cause, Difficult intubation, Headache, History of abuse in adulthood, History of abuse in childhood, Liver disease, Malignant hyperthermia due to anesthesia, Nausea & vomiting, Pseudocholinesterase deficiency, Psychotic disorder ([address] PUD (peptic ulcer disease), Seizures ([address] Sleep apnea, Stroke ([address] Thromboembolus ([address] Thyroid disease, Trauma, or Unspecified adverse effect of anesthesia. Mr. Casey Mendez  has a past surgical history that includes hx implantable cardioverter defibrillator (2009); hx other surgical (Feb, March 2012); pr cardiac surg procedure unlist; and hx colonoscopy. Social History/Living Environment:  
  
 
Social History Socioeconomic History  Marital status:  Spouse name: Not on file  Number of children: Not on file  Years of education: Not on file  Highest education level: Not on file Occupational History  Not on file Social Needs  Financial resource strain: Not on file  Food insecurity Worry: Not on file Inability: Not on file  Transportation needs Medical: Not on file Non-medical: Not on file Tobacco Use  Smoking status: Current Some Day Smoker Packs/day: 0.50 Years: 40.00 Pack years: 20.00  Smokeless tobacco: Never Used Substance and Sexual Activity  Alcohol use: Not Currently Alcohol/week: 0.0 standard drinks Comment: rare  Drug use: No  
 Sexual activity: Not on file Lifestyle  Physical activity Days per week: Not on file Minutes per session: Not on file  Stress: Not on file Relationships  Social connections Talks on phone: Not on file Gets together: Not on file Attends Anabaptist service: Not on file Active member of club or organization: Not on file Attends meetings of clubs or organizations: Not on file Relationship status: Not on file  Intimate partner violence Fear of current or ex partner: Not on file Emotionally abused: Not on file Physically abused: Not on file Forced sexual activity: Not on file Other Topics Concern  Not on file Social History Narrative  Not on file Prior Level of Function/Work/Activity: 
Normal  - no issues. Not working-- he is on disability. Previous Treatment Approach Previous Physical Therapy  --Worked well for shoulder. Dominant Side: Right Other Clinical Tests X-RAY Negative for fx Active Ambulatory Problems Diagnosis Date Noted  Tobacco abuse disorder 11/17/2015  Systolic CHF, chronic ([address] 11/17/2015  ED (erectile dysfunction) 11/17/2015  Paroxysmal atrial fibrillation ([address] 11/17/2015  Syncope and collapse 11/17/2015  Type II diabetes mellitus, uncontrolled ([address] 11/17/2015  AICD (automatic cardioverter/defibrillator) present 11/17/2015  Paroxysmal VT ([address] 11/17/2015  Coronary atherosclerosis of native coronary vessel 11/17/2015  
 HTN (hypertension), benign 11/17/2015  HLD (hyperlipidemia) 11/17/2015  NICM (nonischemic cardiomyopathy) ([facility] 12/27/2017 Resolved Ambulatory Problems Diagnosis Date Noted  Chronic combined systolic and diastolic CHF (congestive heart failure) ([facility] 11/17/2015 Past Medical History:  
Diagnosis Date  Anemia  Arrhythmia Sept. 2009  CAD (coronary artery disease) 2010  CAD (coronary artery disease)  Chronic obstructive pulmonary disease ([facility]  Chronic pain  Depression  Diabetes ([facility] 2011  Dyslipidemia  GERD (gastroesophageal reflux disease)  Heart failure ([facility]  Hypertension x 3 yrs  Morbid obesity ([facility]  Psychiatric disorder  Syncope Note: Patient denies any increase of symptoms with cough, sneeze or valsalva. Patient denies any saddle paresthesia or bowel/bladder deficits. Current Medications:   
Current Outpatient Medications:  
  HYDROcodone-acetaminophen (NORCO) 5-325 mg per tablet, Take 1 Tab by mouth every four (4) hours as needed. , Disp: , Rfl:  
  metFORMIN (GLUCOPHAGE) 500 mg tablet, Take 1 Tab by mouth two (2) times daily (with meals). , Disp: 180 Tab, Rfl: 5 
  topiramate (TOPAMAX) 50 mg tablet, Take 1 Tab by mouth two (2) times a day., Disp: 60 Tab, Rfl: 2 
  diclofenac (VOLTAREN) 1 % gel, Apply 4 g to affected area four (4) times daily. , Disp: 100 g, Rfl: 5 
  multivitamin (ONE A DAY) tablet, Take 1 Tab by mouth daily. Once daily cheaper over-the-counter, Disp: 100 Tab, Rfl: 5 
  carvediloL (COREG) 12.5 mg tablet, Take 1 Tab by mouth two (2) times daily (with meals). , Disp: 180 Tab, Rfl: 5 
  atorvastatin (Lipitor) 40 mg tablet, Take 1 Tab by mouth daily. , Disp: 90 Tab, Rfl: 5 
  aspirin delayed-release 81 mg tablet, Take 1 Tab by mouth daily. Once daily cheaper over-the-counter, Disp: 100 Tab, Rfl: 5 
  valsartan-hydroCHLOROthiazide (DIOVAN-HCT) 160-12.5 mg per tablet, Take 1 Tab by mouth daily. , Disp: 90 Tab, Rfl: 5 Ambulatory/Rehab Services H2 Model Falls Risk Assessment Risk Factors: 
     (1)  Gender [Male] Ability to Rise from Chair: 
     (0)  Ability to rise in a single movement Falls Prevention Plan: No modifications necessary Total: (5 or greater = High Risk): 1 ©2010 [facility] Patent #[id number]. Federal Law prohibits the replication, distribution or use without written permission from [facility] [employer] Date Last Reviewed:  7/30/2020 Number of Personal Factors/Comorbidities that affect the Plan of Care: 1-2: MODERATE COMPLEXITY EXAMINATION:  
Observation/Orthostatic Postural Assessment: Forward Head Palpation:   
      Increased Tenderness with  palpation of R lower back. ROM:   
       
AROM/PROM Joint: Eval Date: 7/30/2020  Re-Assess Date:  Re-Assess Date: Active ROM RIGHT LEFT RIGHT LEFT RIGHT LEFT Knee Extension [facility] Knee Flexion [facility] Hip Flexion [facility] Hip Abduction Lumbar Flexion Full motion, but reports pain with going down. Pain with return to neutral.         
Lumbar Extension Pain--can only perform to neutral.  Comparable sign. Lumbar Side-bending Not painful Not painful Lumbar Rotation Minimal but full motion Minimal but full motion. Repeated Motion: 
Direction    Frequency Symptoms Prior Symptons Post  
Flexion Repeated 10 times  Reproduced Symptoms Extension Sustained Standing  Reproduced Symptoms Strength:   
 Eval Date: 7/30/2020  Re-Assess Date:  Re-Assess Date:   
  RIGHT LEFT RIGHT LEFT RIGHT LEFT Knee Flexion (L5-S2)   5/5  5/5 ?  ?  ?  ?   
Knee Extension (L3, L4)  5/5  5/5 ?  ?  ?  ? Hip Flexion (L1, L2)  5/5  5/5 ?  ?  ?  ? Hip Extension  5/5  5/5 ?  ?  ?  ? Hip Abduction (L5, S1)  5/5  5/5 ?  ?  ?  ? Ankle Dorsiflexion (L4)  5/5  5/5 ?  ?  ?  ? Great Toe Extension (L5)  5/5  5/5 ?  ?  ?  ?   
 Ankle Plantar Flexion (S1-S2)  5/5  5/5 ?  ?  ?  ? Special Tests: SLR: Negative SLUMP: Negative 
 
 
= 
Neurological Screen:  
 RADIATING SYMPTOMS: No 
 
 
 
 
Upper motor Neuron screen Clonus: Negative Babinski: Negative Functional Mobility:  Affecting participation in basic ADLs and functional tasks. Balance:   
 Single Leg Stance: R= <15 seconds L= <15 seconds Body Structures Involved: 1. Bones 2. Joints 3. Muscles 4. Ligaments Body Functions Affected: 1. Sensory/Pain 2. Neuromusculoskeletal 
3. Movement Related Activities and Participation Affected: 1. Mobility 2. Self Care Number of elements that affect the Plan of Care: 4+: HIGH COMPLEXITY CLINICAL PRESENTATION:  
Presentation: Stable and uncomplicated: LOW COMPLEXITY CLINICAL DECISION MAKING:  
  
Use of outcome tool(s) and clinical judgement create a POC that gives a: Clear prediction of patient's progress: LOW COMPLEXITY See associated treatment note for treatment provided today Future Appointments Date Time Provider Taj Johnson 7/30/2020  2:00 PM Ricardo Enriquez DPT [facility]  
9/24/2020  8:00 AM [facility]  
9/24/2020  8:30 AM [facility] DEVICE Najma Chaney Imbuias [phone] Branden Hernandez DPT

## 2020-08-05 ENCOUNTER — HOSPITAL ENCOUNTER (OUTPATIENT)
Dept: PHYSICAL THERAPY | Age: 65
Discharge: HOME | End: 2020-08-05
Payer: MEDICARE

## 2020-08-05 PROCEDURE — 97110 THERAPEUTIC EXERCISES: CPT

## 2020-08-05 NOTE — PROGRESS NOTES
Annie Do  : 1955  Payor[place] / [place] / Product Type: Managed Care Medicare /  [id number] [address] [facility] [address]. [facility], [address] [place] [address], [facility], [address] [place] [address]  [phone]   [phone]                                                          Marsha Reyes MD      OUTPATIENT PHYSICAL THERAPY: Daily Treatment Note 2020 Visit Count:  2    Tx Diagnosis   Low back pain (M54.5)  Muscle Weakness, Generalized (M62.81)  Muscle spasm of back (M62.830)  Abnormal posture (R29.3)        Pre-treatment Symptoms/Complaints:  See Initial Eval Dated 20 for more details. n   I think it's getting better. I have pain, but it's not as bad. When I was out there sitting it hurt. Pain: Initial:6/10 Post Session: 010   Medications Last Reviewed:  2020     Updated Objective Findings: See Initial Eval for more details. TREATMENT:   THERAPEUTIC EXERCISE: (40 minutes):  Exercises per grid below to improve mobility, strength and balance. Required minimal visual, verbal and manual cues to promote proper body alignment and promote proper body posture. Progressed resistance and complexity of movement as indicated. Date:  Eval Date:  20 Date:     Activity/Exercise Parameters Parameters Parameters   Education HEP, POC, PT goals, anatomy/pathology     LTR NV 10x10\"    SKTC NV 30\"X3     NuStep  10 minutes     HRs  20x     L Stretch  30\"x3    Walking       Emily  2 minutes to work on lat stretch as he has R flank discomfort/pain. Child's pose  30\"x3    Rows   10x10\" Black band    ER   Green 10x10\"                      MANUAL THERAPY: (0 minutes): Joint mobilization, Soft tissue mobilization was utilized and necessary because of the patient's restricted joint motion and restricted motion of soft tissue mobility.         Date  2020    Technique Used Grade  Level # Time(s) Effect while being performed [facility] Portal  Treatment/Session Summary:    Response to Treatment: Evaluation tolerated well. Progressed exercises as seen above--no increase in pain--does well with activity. Communication/Consultation:  POC, HEP, PT goals, Faxed initial evaluation to MD.   Equipment provided today: HEP Handout     Recommendations/Intent for next treatment session:   Next visit will focus on Manual Therapy Core Stability Pain Science Education soft tissue mobilization Exercise. Treatment Plan of Care Effective Dates: 8/5/2020 TO 10/28/2020 (90 days).   Frequency/Duration: 2 times a week for 90 Days             Total Treatment Billable Duration:  40 minutes    PT Patient Time In/Time Out  Time In: 0940  Time [facility]: [facility] [address] Mercedes DPT    Future Appointments   Date Time Provider Taj Johnson   8/11/2020  9:30 AM Evonne Epperson DPT [facility]   8/14/2020  9:30 AM Evonne Epperson DPT [facility]   8/18/2020  9:00 AM Tiffanie WOO [facility]   8/21/2020  9:00 AM Tiffanie WOO [facility]   8/26/2020  9:30 AM Evonne Epperson DPT [facility]   9/24/2020  8:00 AM DO RAZIA Walden [facility]   9/24/2020  8:30 AM GVLLE DEVICE Najma Chaney [phone]

## 2020-08-11 ENCOUNTER — HOSPITAL ENCOUNTER (OUTPATIENT)
Dept: PHYSICAL THERAPY | Age: 65
Discharge: HOME | End: 2020-08-11
Payer: MEDICARE

## 2020-08-11 PROCEDURE — 97110 THERAPEUTIC EXERCISES: CPT

## 2020-08-11 NOTE — PROGRESS NOTES
Abe Do  : 1955  [place] / [place] / Product Type: Managed Care Medicare /  [address] [place] [address] at [address] [place] [address]. [facility], [address] [place] [address], [address] [place] [address]  [phone]   [phone]                                                          Leonardo Cordero MD      OUTPATIENT PHYSICAL THERAPY: Daily Treatment Note 2020 Visit Count:  3    Tx Diagnosis   Low back pain (M54.5)  Muscle Weakness, Generalized (M62.81)  Muscle spasm of back (M62.830)  Abnormal posture (R29.3)        Pre-treatment Symptoms/Complaints:  See Initial Eval Dated 20 for more details. I was able to do my walk and the pain is getting better. Pain: Initial:310 Post Session: 0/10   Medications Last Reviewed:  2020     Updated Objective Findings: See Initial Eval for more details. TREATMENT:   THERAPEUTIC EXERCISE: (39 minutes):  Exercises per grid below to improve mobility, strength and balance. Required minimal visual, verbal and manual cues to promote proper body alignment and promote proper body posture. Progressed resistance and complexity of movement as indicated. \ Date:  Eval Date:  20 Date:  20   Activity/Exercise Parameters Parameters Parameters   Education HEP, POC, PT goals, anatomy/pathology     LTR NV 10x10\"    SKTC NV 30\"X3  30\"x3 seated    NuStep  10 minutes  10 minutes Level 3   HRs  20x     L Stretch  30\"x3 30\"x3   Walking    Outside 6 minutes    Emily  2 minutes to work on lat stretch as he has R flank discomfort/pain. 2 minutes to work on lat stretch as he has R flank discomfort/pain   Child's pose  30\"x3 30\"x3   Rows   10x10\" Black band 10x10\" Black band   ER   Green 10x10\" Green 10x10\"   Monster Walks                   [facility] Portal  Treatment/Session Summary:    Response to Treatment: Evaluation tolerated well.   Walked outside to work in endurance - showing better tolerance each session. Performed SKTC seated. Communication/Consultation:  POC, HEP, PT goals, Faxed initial evaluation to MD.   Equipment provided today: HEP Handout     Recommendations/Intent for next treatment session:   Next visit will focus on Manual Therapy Core Stability Pain Science Education soft tissue mobilization Exercise. Treatment Plan of Care Effective Dates: 8/11/2020 TO 10/28/2020 (90 days).   Frequency/Duration: 2 times a week for 90 Days             Total Treatment Billable Duration:  39 minutes    PT Patient Time In/Time Out  Time In: 0930  Time Out: [address] Olivia Isaac DPT    Future Appointments   Date Time Provider Taj Johnson   8/14/2020  9:30 AM Verito Garcia DPT [facility]   8/18/2020  9:00 AM Mi Camp CHILO [facility]   8/21/2020  9:00 AM Mi Camp N [facility]   8/26/2020  9:30 AM Verito Garcia DPT [facility]   9/24/2020  8:00 AM DO RAZIA French [facility]   9/24/2020  8:30 AM GVLLE DEVICE Najma Chaney [phone]

## 2020-08-14 ENCOUNTER — HOSPITAL ENCOUNTER (OUTPATIENT)
Dept: PHYSICAL THERAPY | Age: 65
Discharge: HOME | End: 2020-08-14
Payer: MEDICARE

## 2020-08-14 PROCEDURE — 97110 THERAPEUTIC EXERCISES: CPT

## 2020-08-14 NOTE — PROGRESS NOTES
Pushpa Do  : 1955  Pay[place] / [place] / Product Type: Managed Care Medicare /  [address] [place] [address] at [address] [place] [address]. [address] [place] [address], [address] [place] [address]  [phone]   [phone]                                                          Genevieve Kelly MD      OUTPATIENT PHYSICAL THERAPY: Daily Treatment Note 2020 Visit Count:  4    Tx Diagnosis   Low back pain (M54.5)  Muscle Weakness, Generalized (M62.81)  Muscle spasm of back (M62.830)  Abnormal posture (R29.3)        Pre-treatment Symptoms/Complaints:  See Initial Eval Dated 20 for more details. I feel like I'm getting better. Pain: Initial: 410 Post Session: 0/10   Medications Last Reviewed:  2020     Updated Objective Findings: See Initial Eval for more details. TREATMENT:   THERAPEUTIC EXERCISE: (55 minutes) Proper body alignment and promote proper body posture. Progressed resistance and complexity of movement as indicated. \ Date:  Eval Date:  20 Date:  20 Date:  20 Date:     Activity/Exercise Parameters Parameters Parameters     Education HEP, POC, PT goals, anatomy/pathology       LTR NV 10x10\"      SKTC NV 30\"X3  30\"x3 seated      NuStep  10 minutes  10 minutes Level 3 10 minutes Level 5, NuStep    HRs  20x   30\"x3    L Stretch  30\"x3 30\"x3 30\"X3     Walking    Outside 6 minutes  5 minutes     Emily  2 minutes to work on lat stretch as he has R flank discomfort/pain. 2 minutes to work on lat stretch as he has R flank discomfort/pain 5 minutes--helpful for him - enjoys this stretch.     Child's pose  30\"x3 30\"x3 30\"x3     Rows   10x10\" Black band 10x10\" Black band 10x10\" Black band    ER   Green 10x10\" Green 10x10\" Blue 10x10\"    Monster Walks     Red 2x around knees in hallway    Gastroc Stretch    30\"x3     Dead lift        Squat                      Med[employer] Portal  Treatment/Session Summary: Response to Treatment: Evaluation tolerated well. Pain seems to alleviate/dissipate completely with exercise/mobility. Will continue strengthening per POC. Communication/Consultation:  POC, HEP, PT goals, Faxed initial evaluation to MD.   Equipment provided today: HEP Handout     Recommendations/Intent for next treatment session:   Next visit will focus on Manual Therapy Core Stability Pain Science Education soft tissue mobilization Exercise. Treatment Plan of Care Effective Dates: 8/14/2020 TO 10/28/2020 (90 days).   Frequency/Duration: 2 times a week for 90 Days             Total Treatment Billable Duration: 55 minutes    PT Patient Time In/Time Out  Time In: 0920  Time Out: [address], DPT    Future Appointments   Date Time Provider Taj Johnson   8/18/2020  9:00 AM [facility]   8/21/2020  9:00 AM Duyen [facility]OO [facility]   8/26/2020  9:30 AM Mari Smiley [facility]PT [facility]   9/24/2020  8:00 AM DO RAZIA Cevallos [facility]   9/24/2020  8:30 AM GVLLE DEVICE Najma Chaney Imbuias [phone]

## 2020-08-14 NOTE — PROGRESS NOTES
Ruben Do  : 1955  [place] / [place] / Product Type: Managed Care Medicare /  [address] [place] [address] at [address] [place] [address]. [address] [place] [address], [address] [place] [address]  [phone]   [phone]                                                          Noemi Lou MD      OUTPATIENT PHYSICAL THERAPY: Daily Treatment Note 2020 Visit Count:  4    Tx Diagnosis   Low back pain (M54.5)  Muscle Weakness, Generalized (M62.81)  Muscle spasm of back (M62.830)  Abnormal posture (R29.3)        Pre-treatment Symptoms/Complaints:  See Initial Eval Dated 20 for more details. I feel pretty good today. Pain: Initial:4 10 Post Session: 0/10   Medications Last Reviewed:  2020     Updated Objective Findings: See Initial Eval for more details. TREATMENT:   THERAPEUTIC EXERCISE: (55 minutes) Proper body alignment and promote proper body posture. Progressed resistance and complexity of movement as indicated. \ Date:  Eval Date:  20 Date:  20 Date:  20   Activity/Exercise Parameters Parameters Parameters    Education HEP, POC, PT goals, anatomy/pathology      LTR NV 10x10\"     SKTC NV 30\"X3  30\"x3 seated     NuStep  10 minutes  10 minutes Level 3 10 minutes Level 5, NuStep   HRs  20x   30\"x3   L Stretch  30\"x3 30\"x3 30\"X3    Walking    Outside 6 minutes  5 minutes    Emily  2 minutes to work on lat stretch as he has R flank discomfort/pain. 2 minutes to work on lat stretch as he has R flank discomfort/pain 5 minutes--helpful for him - enjoys this stretch.    Child's pose  30\"x3 30\"x3 30\"x3    Rows   10x10\" Black band 10x10\" Black band 10x10\" Black band   ER   Green 10x10\" Green 10x10\" Blue 10x10\"   Monster Walks     Red 2x around knees in hallway   Gastroc Stretch    30\"x3    Dead lift       Squat                    MedBridge Portal  Treatment/Session Summary:    Response to Treatment: Evaluation tolerated well. Pain seems to alleviate/dissipate completely with exercise/mobility. Will continue strengthening per POC. Communication/Consultation:  POC, HEP, PT goals, Faxed initial evaluation to MD.   Equipment provided today: HEP Handout     Recommendations/Intent for next treatment session:   Next visit will focus on Manual Therapy Core Stability Pain Science Education soft tissue mobilization Exercise. Treatment Plan of Care Effective Dates: 8/14/2020 TO 10/28/2020 (90 days).   Frequency/Duration: 2 times a week for 90 Days             Total Treatment Billable Duration:  39 minutes    PT Patient Time In/Time Out  Time In: 0920  Time Out: [address], DPT    Future Appointments   Date Time Provider Taj Johnson   8/18/2020  9:00 AM [facility]   8/21/2020  9:00 AM Leodan [facility]   8/26/2020  9:30 AM Narciso Rocha DPT [facility]   9/24/2020  8:00 AM Hu Reece DO SSA [facility]   9/24/2020  8:30 AM GVLLE DEVICE Najma Chaney [address] [phone]

## 2020-08-18 ENCOUNTER — HOSPITAL ENCOUNTER (OUTPATIENT)
Dept: PHYSICAL THERAPY | Age: 65
Discharge: HOME | End: 2020-08-18
Payer: MEDICARE

## 2020-08-18 PROCEDURE — 97110 THERAPEUTIC EXERCISES: CPT

## 2020-08-18 NOTE — PROGRESS NOTES
Ovidio Do  : 1955  Pay[place] Elod[place] / [place] / Product Type: Managed Care Medicare /  [address] at [address] [facility]. [facility], [address] [place] [address], [address] [place] [address]  [phone]   [phone]                                                          José Miguel Davies MD      OUTPATIENT PHYSICAL THERAPY: Daily Treatment Note 2020 Visit Count:  5    Tx Diagnosis   Low back pain (M54.5)  Muscle Weakness, Generalized (M62.81)  Muscle spasm of back (M62.830)  Abnormal posture (R29.3)        Pre-treatment Symptoms/Complaints:  See Initial Eval Dated 20 for more details. I feel like I'm getting better. Pain: Initial: 410 Post Session: 0/10   Medications Last Reviewed:  2020     Updated Objective Findings: See Initial Eval for more details. TREATMENT:   THERAPEUTIC EXERCISE: (54 minutes) Proper body alignment and promote proper body posture. Progressed resistance and complexity of movement as indicated. \ Date:  Eval Date:  20 Date:  20 Date:  20 Date:  20   Activity/Exercise Parameters Parameters Parameters     Education HEP, POC, PT goals, anatomy/pathology       LTR NV 10x10\"      SKTC NV 30\"X3  30\"x3 seated      NuStep  10 minutes  10 minutes Level 3 10 minutes Level 5, NuStep 10 minutes Level 5, NuStep   HRs  20x   30    L Stretch  30\"x3 30\"x3 30\"X3  30\"x3    Walking    Outside 6 minutes  5 minutes  10 minutes outside *   Emily  2 minutes to work on lat stretch as he has R flank discomfort/pain. 2 minutes to work on lat stretch as he has R flank discomfort/pain 5 minutes--helpful for him - enjoys this stretch.  5' for stretch   Child's pose  30\"x3 30\"x3 30\"x3     Rows   10x10\" Black band 10x10\" Black band 10x10\" Black band 27# cues for posture, using rope 3x10    ER   Green 10x10\" Green 10x10\" Blue 10x10\"    Monster Walks     Red 2x around knees in hallway Red band down hallway 2x    Gastroc Stretch    30\"x3  30\"x3   Dead lift     NT   Squat     Ball on wall 10x2 green ball - low squat   Shuttle     75# 3x10    Pec Stretch        Marching seated on SB (green\")     10 each leg focusing on core and pelvic positioning. Straight arm pulldown (using rope but keeping arms straight)     20# 2x10     He turns it more into a tricep extension, cues to keep arms straight. Eye Phone Portal  Treatment/Session Summary:    Response to Treatment: Evaluation tolerated well. Following exercises as seen above--showing great progress and understanding the importance of regular exercise/mobility. Will see another therapist next visit and then will return to primary PT - expecting DC end of the month. Session longer today due to time allowable and patient willing to continue strengthening. Marching difficult for him on ball--he did get hang of it after cuing. Communication/Consultation:  POC, HEP, PT goals, Faxed initial evaluation to MD.   Equipment provided today: HEP Handout     Recommendations/Intent for next treatment session:   Next visit will focus on Manual Therapy Core Stability Pain Science Education soft tissue mobilization Exercise. Treatment Plan of Care Effective Dates: 8/18/2020 TO 10/28/2020 (90 days).   Frequency/Duration: 2 times a week for 90 Days             Total Treatment Billable Duration: 54 minutes    PT Patient Time In/Time Out  Time In: 0708  Time Out: [facility] Kathy Freeman DPT    Future Appointments   Date Time Provider Taj Johnson   8/21/2020  9:00 AM [facility]   8/26/2020  9:30 AM IRASEMA [facility]   9/24/2020  8:00 AM DO RAZIA Rodas [facility]CD   9/24/2020  8:30 AM GVLLE DEVICE Najma Chaney [phone]

## 2020-08-20 NOTE — PROGRESS NOTES
Shad Do  : 1955  [place] / [place] / Product Type: Managed Care Medicare /  [address] [place] [address] Road[address] at [address] [place] [address]. [address] [place] [address], [address] [place] [address]  [phone]   [phone]                                                          Alejandro Smith MD      OUTPATIENT PHYSICAL THERAPY: Daily Treatment Note 2020 Visit Count:  6    Tx Diagnosis   Low back pain (M54.5)  Muscle Weakness, Generalized (M62.81)  Muscle spasm of back (M62.830)  Abnormal posture (R29.3)        Pre-treatment Symptoms/Complaints:  See Initial Eval Dated 20 for more details. I feel great. Not having pain in the R side anymore. Pain: Initial: 410 Post Session: 0/10   Medications Last Reviewed:  2020     Updated Objective Findings: See Initial Eval for more details. TREATMENT:   THERAPEUTIC EXERCISE: (40 minutes) Proper body alignment and promote proper body posture. Progressed resistance and complexity of movement as indicated. \ Date:  Eval Date:  20 Date:  20 Date:  20 Date:  20 Date  20   Activity/Exercise Parameters Parameters Parameters      Education HEP, POC, PT goals, anatomy/pathology        LTR NV 10x10\"       SKTC NV 30\"X3  30\"x3 seated       NuStep  10 minutes  10 minutes Level 3 10 minutes Level 5, NuStep 10 minutes Level 5, NuStep 10 mins L 7   HRs  20x   30     L Stretch  30\"x3 30\"x3 30\"X3  30\"x3  3x30\"   Walking    Outside 6 minutes  5 minutes  10 minutes outside *    Emily  2 minutes to work on lat stretch as he has R flank discomfort/pain. 2 minutes to work on lat stretch as he has R flank discomfort/pain 5 minutes--helpful for him - enjoys this stretch.  5' for stretch    Child's pose  30\"x3 30\"x3 30\"x3      Rows   10x10\" Black band 10x10\" Black band 10x10\" Black band 27# cues for posture, using rope 3x10  27# 3x10   ER   Green 10x10\" Green 10x10\" Blue 10x10\" Monster Walks     Red 2x around knees in hallway Red band down hallway 2x  Red band down hallway 4x    Gastroc Stretch    30\"x3  30\"x3 3x30\"   Dead lift     NT    Squat     Ball on wall 10x2 green ball - low squat Ball on wall 10x2 green ball - low squat   Shuttle     75# 3x10  75# 4x10   Pec Stretch         Marching seated on SB (green\")     10 each leg focusing on core and pelvic positioning. 30 marches 2 sets on ball focusing on core and pelvic positioning. Straight arm pulldown (using rope but keeping arms straight)     20# 2x10     He turns it more into a tricep extension, cues to keep arms straight. 23# 3x10   STS      With 15# 3x10         [email or web address] Portal  Treatment/Session Summary:    Response to Treatment: Evaluation tolerated well. Following exercises as seen above--Pt progressed well with increase in resistance and reps with exercises. No significant increase in pain. Pt progressing well towards goals and to return to primary therapist next session. Good stability on ball today with marching     Communication/Consultation:  POC, HEP, PT goals, Faxed initial evaluation to MD.   Equipment provided today: HEP Handout     Recommendations/Intent for next treatment session:   Next visit will focus on Manual Therapy Core Stability Pain Science Education soft tissue mobilization Exercise. Treatment Plan of Care Effective Dates: 8/21/2020 TO 10/28/2020 (90 days).   Frequency/Duration: 2 times a week for 90 Days             Total Treatment Billable Duration: 40 minutes    PT Patient Time In/Time Out  Time In: 0900  Time Out: Ben Tuttle 103    Future Appointments   Date Time Provider Taj Johnson   8/26/2020  9:30 AM Sherry Scott DPT [facility]   9/24/2020  8:00 AM DO RAZIA Oliveira [facility]CD[facility] UCD   9/24/2020  8:30 AM GVLLE DEVICE Najma Chaney [address] [phone]

## 2020-08-21 ENCOUNTER — HOSPITAL ENCOUNTER (OUTPATIENT)
Dept: PHYSICAL THERAPY | Age: 65
Discharge: HOME | End: 2020-08-21
Payer: MEDICARE

## 2020-08-21 PROCEDURE — 97110 THERAPEUTIC EXERCISES: CPT

## 2020-08-28 ENCOUNTER — APPOINTMENT (OUTPATIENT)
Dept: PHYSICAL THERAPY | Age: 65
End: 2020-08-28
Payer: MEDICARE

## 2022-03-18 PROBLEM — I42.8 NICM (NONISCHEMIC CARDIOMYOPATHY) (HCC): Status: ACTIVE | Noted: 2017-12-27

## 2022-05-18 PROBLEM — I73.9 CLAUDICATION (HCC): Status: ACTIVE | Noted: 2022-05-18

## 2022-06-03 ENCOUNTER — TELEPHONE (OUTPATIENT)
Dept: CARDIOLOGY CLINIC | Age: 67
End: 2022-06-03

## 2022-06-03 NOTE — TELEPHONE ENCOUNTER
Called pt advised that Dr. Any Barahona would not be back in the office until 6/13/22. Pt states that is ok will wait until than for the application.

## 2022-06-15 ENCOUNTER — TELEPHONE (OUTPATIENT)
Dept: CARDIOLOGY CLINIC | Age: 67
End: 2022-06-15

## 2022-06-27 ENCOUNTER — OFFICE VISIT (OUTPATIENT)
Dept: CARDIOLOGY CLINIC | Age: 67
End: 2022-06-27
Payer: MEDICARE

## 2022-06-27 VITALS
BODY MASS INDEX: 26.94 KG/M2 | SYSTOLIC BLOOD PRESSURE: 138 MMHG | HEART RATE: 76 BPM | HEIGHT: 70 IN | DIASTOLIC BLOOD PRESSURE: 86 MMHG | WEIGHT: 188.2 LBS

## 2022-06-27 DIAGNOSIS — Z72.0 TOBACCO ABUSE DISORDER: ICD-10-CM

## 2022-06-27 DIAGNOSIS — Z95.810 AICD (AUTOMATIC CARDIOVERTER/DEFIBRILLATOR) PRESENT: Primary | ICD-10-CM

## 2022-06-27 DIAGNOSIS — I10 HTN (HYPERTENSION), BENIGN: ICD-10-CM

## 2022-06-27 DIAGNOSIS — I47.29 PAROXYSMAL VT: ICD-10-CM

## 2022-06-27 DIAGNOSIS — I25.10 ATHEROSCLEROSIS OF NATIVE CORONARY ARTERY OF NATIVE HEART WITHOUT ANGINA PECTORIS: ICD-10-CM

## 2022-06-27 DIAGNOSIS — I42.8 NICM (NONISCHEMIC CARDIOMYOPATHY) (HCC): ICD-10-CM

## 2022-06-27 DIAGNOSIS — I73.9 PVD (PERIPHERAL VASCULAR DISEASE) (HCC): ICD-10-CM

## 2022-06-27 DIAGNOSIS — I48.0 PAROXYSMAL ATRIAL FIBRILLATION (HCC): ICD-10-CM

## 2022-06-27 DIAGNOSIS — I50.22 SYSTOLIC CHF, CHRONIC (HCC): ICD-10-CM

## 2022-06-27 LAB
ALBUMIN SERPL-MCNC: 4.2 G/DL (ref 3.2–4.6)
ALBUMIN/GLOB SERPL: 1.4 {RATIO} (ref 1.2–3.5)
ALP SERPL-CCNC: 66 U/L (ref 50–136)
ALT SERPL-CCNC: 21 U/L (ref 12–65)
ANION GAP SERPL CALC-SCNC: 5 MMOL/L (ref 7–16)
AST SERPL-CCNC: 23 U/L (ref 15–37)
BILIRUB SERPL-MCNC: 0.5 MG/DL (ref 0.2–1.1)
BUN SERPL-MCNC: 15 MG/DL (ref 8–23)
CALCIUM SERPL-MCNC: 9.6 MG/DL (ref 8.3–10.4)
CHLORIDE SERPL-SCNC: 108 MMOL/L (ref 98–107)
CO2 SERPL-SCNC: 27 MMOL/L (ref 21–32)
CREAT SERPL-MCNC: 1.2 MG/DL (ref 0.8–1.5)
ERYTHROCYTE [DISTWIDTH] IN BLOOD BY AUTOMATED COUNT: 16.6 % (ref 11.9–14.6)
GLOBULIN SER CALC-MCNC: 3.1 G/DL (ref 2.3–3.5)
GLUCOSE SERPL-MCNC: 70 MG/DL (ref 65–100)
HCT VFR BLD AUTO: 51.8 % (ref 41.1–50.3)
HGB BLD-MCNC: 16.6 G/DL (ref 13.6–17.2)
MAGNESIUM SERPL-MCNC: 2.8 MG/DL (ref 1.8–2.4)
MCH RBC QN AUTO: 32 PG (ref 26.1–32.9)
MCHC RBC AUTO-ENTMCNC: 32 G/DL (ref 31.4–35)
MCV RBC AUTO: 99.8 FL (ref 79.6–97.8)
NRBC # BLD: 0 K/UL (ref 0–0.2)
PLATELET # BLD AUTO: 167 K/UL (ref 150–450)
PMV BLD AUTO: 9.6 FL (ref 9.4–12.3)
POTASSIUM SERPL-SCNC: 4 MMOL/L (ref 3.5–5.1)
PROT SERPL-MCNC: 7.3 G/DL (ref 6.3–8.2)
RBC # BLD AUTO: 5.19 M/UL (ref 4.23–5.6)
SODIUM SERPL-SCNC: 140 MMOL/L (ref 136–145)
WBC # BLD AUTO: 5.9 K/UL (ref 4.3–11.1)

## 2022-06-27 PROCEDURE — 1123F ACP DISCUSS/DSCN MKR DOCD: CPT | Performed by: INTERNAL MEDICINE

## 2022-06-27 PROCEDURE — 99215 OFFICE O/P EST HI 40 MIN: CPT | Performed by: INTERNAL MEDICINE

## 2022-06-27 NOTE — PROGRESS NOTES
[address], [address] [facility], [address] [place] [address]  PHONE: [phone]     22    NAME:  Corey Metz  : 1955  MRN: [id number]       SUBJECTIVE:   Corey Metz is a 73 y.o. male seen for a follow up visit regarding the following:     Chief Complaint   Patient presents with    Congestive Heart Failure    Results     LE       HPI: Here for CV, SHF/NICM follow up,    [facility]  with mod CAD, ICD  after syncope and VT   cSHF (EF 25% )   pAF (likes being just on ASA). Echo 3/2019: EF 40-45%   Echo 2022: EF 30%      [facility] Admission 2018: alcohol induced pancreatitis. His stay was complicated with an ileus and delirium from alcohol withdrawal.      Abnormal duplex, more left leg pains now. The left middle superficial femoral artery demonstrates severe (76-99%) stenosis. More leg pains with walking, worse in left leg than the right, but both bad. No worsening COLES. No CP, pressure. Stopped alcohol, less smoking. He has NYHA Class II sx now. [facility]. Patient denies recent history of orthopnea, PND, excessive dizziness and/or syncope. Past Medical History, Past Surgical History, Family history, Social History, and Medications were all reviewed with the patient today and updated as necessary. Current Outpatient Medications   Medication Sig Dispense Refill    amLODIPine (NORVASC) 10 MG tablet Take by mouth daily      aspirin 81 MG EC tablet Take 81 mg by mouth daily      atorvastatin (LIPITOR) 40 MG tablet Take 40 mg by mouth daily      carvedilol (COREG) 12.5 MG tablet Take 12.5 mg by mouth 2 times daily (with meals)      diclofenac sodium (VOLTAREN) 1 % GEL Apply 4 g topically 4 times daily      HYDROcodone-acetaminophen (NORCO) 5-325 MG per tablet Take 1 tablet by mouth every 4 hours as needed. No current facility-administered medications for this visit.         No Known Allergies  Patient Active Problem List    Diagnosis Date Noted    PVD (peripheral vascular disease) ([facility] 06/27/2022     Priority: Medium    Claudication ([facility] 05/18/2022    NICM (nonischemic cardiomyopathy) ([facility] 12/27/2017    AICD (automatic cardioverter/defibrillator) present 11/17/2015    HTN (hypertension), benign 11/17/2015    Paroxysmal VT ([facility] 11/17/2015     Syncope with paroxysmal episodes of VT and easily inducible VT on EP   study. ICD implanted Sept 09.  Coronary atherosclerosis of native coronary vessel 11/17/2015     [facility] 2009: moderate CAD        Syncope and collapse 11/17/2015     Micturition syncope Dec 09. Repeat episode of syncope 10/23/11 and again   Feb 2012. Taken to ER, but not admitted. Seen by neurology and given a   seizure med. No syncope since.  ED (erectile dysfunction) 11/17/2015    Type II diabetes mellitus, uncontrolled ([facility] 11/17/2015    Paroxysmal atrial fibrillation ([facility] 11/17/2015    HLD (hyperlipidemia) 11/17/2015    Tobacco abuse disorder 44/49/1292    Systolic CHF, chronic ([address] 11/17/2015     Echo 1/15: EF 55%, mild MR  Echo 2009: EF 25%          Past Surgical History:   Procedure Laterality Date    CARDIAC DEFIBRILLATOR PLACEMENT  2009    icd    COLONOSCOPY      OTHER SURGICAL HISTORY  Feb, March 2012    ANSELMO    PA CARDIAC SURG PROCEDURE UNLIST       Family History   Problem Relation Age of Onset    Heart Disease Brother     Hypertension Brother     Diabetes Brother     Hypertension Father     Diabetes Mother     Hypertension Mother     Diabetes Paternal Grandmother     Diabetes Paternal Grandfather      Social History     Tobacco Use    Smoking status: Current Some Day Smoker     Packs/day: 0.50    Smokeless tobacco: Never Used   Substance Use Topics    Alcohol use: Not Currently     Alcohol/week: 0.0 standard drinks         ROS:    Constitutional:   Negative for fevers and unexplained weight loss. Eyes:   Negative for visual disturbance.   ENT:   Negative for significant hearing loss and tinnitus. Respiratory:   Negative for hemoptysis. Cardiovascular:   Negative except as noted in HPI. Gastrointestinal:   Negative for melena and abdominal pain. Genitourinary:   Negative for hematuria, renal stones. Integumentary:   Negative for rash or non-healing wounds  Hematologic/Lymphatic:   Negative for excessive bleeding hx or clotting disorder. Musculoskeletal:  Negative for active, unexplained/severe joint pain. Neurological:   Negative for stroke. Behavioral/Psych:   Negative for suicidal ideations. Endocrine:   Negative for uncontrolled diabetic symptoms including polyuria, polydipsia and poor wound healing.          PHYSICAL EXAM:     /86   Pulse 76   Ht 5' 10\" (1.778 m)   Wt 188 lb 3.2 oz (85.4 kg)   BMI 27.00 kg/m²    General/Constitutional:   Alert and oriented x 3, no acute distress  HEENT:   normocephalic, atraumatic, moist mucous membranes  Neck:   No JVD or carotid bruits bilaterally  Cardiovascular:   regular rate and rhythm, no murmur/rub/gallop appreciated  Pulmonary:   clear to auscultation bilaterally, no respiratory distress  Abdomen:   soft, non-tender, non-distended  Ext:   No sig LE edema bilaterally  Skin:  warm and dry, no obvious rashes seen  Neuro:   no obvious sensory or motor deficits  Psychiatric:   normal mood and affect      Lab Results   Component Value Date     07/20/2020    K 4.0 07/20/2020     07/20/2020    CO2 28 07/20/2020    BUN 20 07/20/2020    CREATININE 1.09 07/20/2020    GLUCOSE 111 07/20/2020    CALCIUM 9.1 07/20/2020        Lab Results   Component Value Date    WBC 5.5 07/20/2020    HGB 14.8 07/20/2020    HCT 44.1 07/20/2020    MCV 97.6 07/20/2020     (L) 07/20/2020       Lab Results   Component Value Date    TSH 1.300 07/20/2020       Lab Results   Component Value Date    LABA1C 6.7 (H) 07/20/2020     Lab Results   Component Value Date     07/20/2020       Lab Results   Component Value Date    CHOL 156 07/20/2020     Lab Results   Component Value Date    TRIG 75 07/20/2020     Lab Results   Component Value Date    HDL 40 07/20/2020     Lab Results   Component Value Date    LDLCALC 101 (H) 07/20/2020     Lab Results   Component Value Date    LABVLDL 15 07/20/2020     Lab Results   Component Value Date    CHOLHDLRATIO 3.9 07/20/2020           I have Independently reviewed prior care notes, any ER records available, cardiac testing, labs and results with the patient and before seeing the patient today. Also independently reviewed outside records when available. ASSESSMENT:    Dagoberto Gongora was seen today for congestive heart failure and results. Diagnoses and all orders for this visit:    AICD (automatic cardioverter/defibrillator) present    HTN (hypertension), benign    Paroxysmal VT ([address]    Atherosclerosis of native coronary artery of native heart without angina pectoris    NICM (nonischemic cardiomyopathy) (HCC)    Paroxysmal atrial fibrillation (HCC)    Systolic CHF, chronic (HCC)    PVD (peripheral vascular disease) ([facility]  -     Case Request Invasive Vascular Procedures  -     Comprehensive Metabolic Panel; Future  -     CBC; Future  -     Magnesium; Future    Tobacco abuse disorder          PLAN:    1. CSHF/NICM: EF 30%, RV pacing < 1%      Have held Ace/ARB in the past with CKD, will not change for now. Remain on coreg. Seems better now. Stopped all alcohol, this has helped more than anything I believe. 2. PAF:  Follow for more pAF, he likes just being on ASA, CVA risk reviewed in the past.  Follow on device checks. 3. CAD:   Remain on ASA, statin and BB. The patient has been instructed to call with any angina or equivalent as reviewed today. All questions were answered with the patient voicing complete understanding. 4. VT/ICD:  Follow in device clinic. 5. HTN:  Follow for now. 6. Tobacco use. Needs to stop smoking, off alcohol now. Reviewed today.         7. HPL: remain on lipitor       8. Claudication:  abnormal duplex, plan on LE cath and PPI as needed. Will follow AAA as well. Left leg pain worsening now. Plan for Catheterization and possible Percutaneous  Intervention (PPI) at [facility] due to Worsening leg pains within the last 2 mos as described in HPI. Discussed risk of catheterization and potential PPI with the patient in detail. These risks include, but are not limited to, bleeding, stroke, heart attack, cardiac arrhythmias, allergic reactions, atheroemboli, acute kidney injury and cardiac arrest/death. Local complications at the site of catheter insertion were also reviewed and discussed. The patient voiced complete understanding about these risks. The patient agrees to proceed with the aforementioned associated risks. Check labs beforehand. Patient has been instructed and agrees to call our office with any issues or other concerns related to their cardiac condition(s) and/or complaint(s).         Return for Return After Test.       BESSY CAMACHO, DO  6/27/2022

## 2022-07-01 ENCOUNTER — HOSPITAL ENCOUNTER (OUTPATIENT)
Age: 67
Setting detail: OUTPATIENT SURGERY
Discharge: HOME | End: 2022-07-01
Attending: INTERNAL MEDICINE | Admitting: INTERNAL MEDICINE
Payer: MEDICARE

## 2022-07-01 VITALS
OXYGEN SATURATION: 98 % | TEMPERATURE: 98.1 F | SYSTOLIC BLOOD PRESSURE: 132 MMHG | DIASTOLIC BLOOD PRESSURE: 76 MMHG | RESPIRATION RATE: 15 BRPM | HEART RATE: 77 BPM

## 2022-07-01 DIAGNOSIS — I73.9 PERIPHERAL ARTERY DISEASE (HCC): ICD-10-CM

## 2022-07-01 PROCEDURE — 75716 ARTERY X-RAYS ARMS/LEGS: CPT | Performed by: INTERNAL MEDICINE

## 2022-07-01 PROCEDURE — 99152 MOD SED SAME PHYS/QHP 5/>YRS: CPT | Performed by: INTERNAL MEDICINE

## 2022-07-01 PROCEDURE — 2709999900 HC NON-CHARGEABLE SUPPLY: Performed by: INTERNAL MEDICINE

## 2022-07-01 PROCEDURE — 2500000003 HC RX 250 WO HCPCS: Performed by: INTERNAL MEDICINE

## 2022-07-01 PROCEDURE — 99153 MOD SED SAME PHYS/QHP EA: CPT | Performed by: INTERNAL MEDICINE

## 2022-07-01 PROCEDURE — C1894 INTRO/SHEATH, NON-LASER: HCPCS | Performed by: INTERNAL MEDICINE

## 2022-07-01 PROCEDURE — C1725 CATH, TRANSLUMIN NON-LASER: HCPCS | Performed by: INTERNAL MEDICINE

## 2022-07-01 PROCEDURE — C1760 CLOSURE DEV, VASC: HCPCS | Performed by: INTERNAL MEDICINE

## 2022-07-01 PROCEDURE — 37226 HC FEM POP TERR PLASTY AND STENT: CPT | Performed by: INTERNAL MEDICINE

## 2022-07-01 PROCEDURE — 37226 PR REVSC OPN/PRQ FEM/POP W/STNT/ANGIOP SM VSL: CPT | Performed by: INTERNAL MEDICINE

## 2022-07-01 PROCEDURE — 6360000002 HC RX W HCPCS: Performed by: INTERNAL MEDICINE

## 2022-07-01 PROCEDURE — C1876 STENT, NON-COA/NON-COV W/DEL: HCPCS | Performed by: INTERNAL MEDICINE

## 2022-07-01 PROCEDURE — 75630 X-RAY AORTA LEG ARTERIES: CPT | Performed by: INTERNAL MEDICINE

## 2022-07-01 PROCEDURE — 75625 CONTRAST EXAM ABDOMINL AORTA: CPT | Performed by: INTERNAL MEDICINE

## 2022-07-01 PROCEDURE — 2580000003 HC RX 258: Performed by: INTERNAL MEDICINE

## 2022-07-01 PROCEDURE — 36247 INS CATH ABD/L-EXT ART 3RD: CPT | Performed by: INTERNAL MEDICINE

## 2022-07-01 PROCEDURE — 6360000004 HC RX CONTRAST MEDICATION: Performed by: INTERNAL MEDICINE

## 2022-07-01 PROCEDURE — C1769 GUIDE WIRE: HCPCS | Performed by: INTERNAL MEDICINE

## 2022-07-01 PROCEDURE — 6370000000 HC RX 637 (ALT 250 FOR IP): Performed by: INTERNAL MEDICINE

## 2022-07-01 DEVICE — ANGIO-SEAL VIP VASCULAR CLOSURE DEVICE
Type: IMPLANTABLE DEVICE | Status: FUNCTIONAL
Brand: ANGIO-SEAL

## 2022-07-01 DEVICE — ABSOLUTE PRO VASCULAR SELF-EXPANDING STENT SYSTEM 6.0 MM X 40 MM X 135 CM / OVER-THE-WIRE
Type: IMPLANTABLE DEVICE | Status: FUNCTIONAL
Brand: ABSOLUTE PRO

## 2022-07-01 RX ORDER — IODIXANOL 320 MG/ML
INJECTION, SOLUTION INTRAVASCULAR PRN
Status: DISCONTINUED | OUTPATIENT
Start: 2022-07-01 | End: 2022-07-01 | Stop reason: HOSPADM

## 2022-07-01 RX ORDER — CLOPIDOGREL BISULFATE 75 MG/1
75 TABLET ORAL DAILY
Qty: 90 TABLET | Refills: 3 | Status: SHIPPED | OUTPATIENT
Start: 2022-07-01

## 2022-07-01 RX ORDER — LIDOCAINE HYDROCHLORIDE 10 MG/ML
INJECTION, SOLUTION INFILTRATION; PERINEURAL PRN
Status: DISCONTINUED | OUTPATIENT
Start: 2022-07-01 | End: 2022-07-01 | Stop reason: HOSPADM

## 2022-07-01 RX ORDER — MIDAZOLAM HYDROCHLORIDE 1 MG/ML
INJECTION INTRAMUSCULAR; INTRAVENOUS PRN
Status: DISCONTINUED | OUTPATIENT
Start: 2022-07-01 | End: 2022-07-01 | Stop reason: HOSPADM

## 2022-07-01 RX ORDER — MAGNESIUM HYDROXIDE/ALUMINUM HYDROXICE/SIMETHICONE 120; 1200; 1200 MG/30ML; MG/30ML; MG/30ML
SUSPENSION ORAL PRN
Status: DISCONTINUED | OUTPATIENT
Start: 2022-07-01 | End: 2022-07-01 | Stop reason: HOSPADM

## 2022-07-01 RX ORDER — SODIUM CHLORIDE 9 MG/ML
INJECTION, SOLUTION INTRAVENOUS CONTINUOUS
Status: DISCONTINUED | OUTPATIENT
Start: 2022-07-01 | End: 2022-07-01 | Stop reason: HOSPADM

## 2022-07-01 RX ORDER — CLOPIDOGREL BISULFATE 75 MG/1
TABLET ORAL PRN
Status: DISCONTINUED | OUTPATIENT
Start: 2022-07-01 | End: 2022-07-01 | Stop reason: HOSPADM

## 2022-07-01 RX ORDER — HEPARIN SODIUM 200 [USP'U]/100ML
INJECTION, SOLUTION INTRAVENOUS CONTINUOUS PRN
Status: COMPLETED | OUTPATIENT
Start: 2022-07-01 | End: 2022-07-01

## 2022-07-01 RX ORDER — BIVALIRUDIN 250 MG/5ML
INJECTION, POWDER, LYOPHILIZED, FOR SOLUTION INTRAVENOUS PRN
Status: DISCONTINUED | OUTPATIENT
Start: 2022-07-01 | End: 2022-07-01 | Stop reason: HOSPADM

## 2022-07-01 NOTE — H&P
Flex Myers DO   Physician   Specialty:  Internal Medicine Cardiovascular Disease   Progress Notes       Signed   Encounter Date:  2022                 Signed        Expand All Collapse All                  2 [facility], [facility] [address]  PHONE: [phone]                22     NAME:  Alvarado Davis  : 1955  MRN: [id number]         SUBJECTIVE:   Alvarado Davis is a 73 y.o. male seen for a follow up visit regarding the following:           Chief Complaint   Patient presents with    Congestive Heart Failure    Results       LE         HPI: Here for CV, SHF/NICM follow up,   [place] [facility]  with mod CAD, ICD  after syncope and VT   cSHF (EF 25% )   pAF (likes being just on ASA).  Echo 3/2019: EF 40-45%   Echo 2022: EF 30%      S Admission 2018: alcohol induced pancreatitis. His stay was complicated with an ileus and delirium from alcohol withdrawal.      Abnormal duplex, more left leg pains now. The left middle superficial femoral artery demonstrates severe (76-99%) stenosis.      More leg pains with walking, worse in left leg than the right, but both bad.  No worsening COLES.   No CP, pressure.  Stopped alcohol, less smoking.  He has NYHA Class II sx now.          [facility].       Patient denies recent history of orthopnea, PND, excessive dizziness and/or syncope.           Past Medical History, Past Surgical History, Family history, Social History, and Medications were all reviewed with the patient today and updated as necessary.      Current Facility-Administered Medications          Current Outpatient Medications   Medication Sig Dispense Refill    amLODIPine (NORVASC) 10 MG tablet Take by mouth daily        aspirin 81 MG EC tablet Take 81 mg by mouth daily        atorvastatin (LIPITOR) 40 MG tablet Take 40 mg by mouth daily        carvedilol (COREG) 12.5 MG tablet Take 12.5 mg by mouth 2 times daily (with meals)        diclofenac sodium (VOLTAREN) 1 % GEL Apply 4 g topically 4 times daily        HYDROcodone-acetaminophen (NORCO) 5-325 MG per tablet Take 1 tablet by mouth every 4 hours as needed.          No current facility-administered medications for this visit. No Known Allergies        Patient Active Problem List     Diagnosis Date Noted    PVD (peripheral vascular disease) ([address] 06/27/2022       Priority: Medium    Claudication ([facility] [facility] 05/18/2022    NICM (nonischemic cardiomyopathy) ([address] [facility] 12/27/2017    AICD (automatic cardioverter/defibrillator) present 11/17/2015    HTN (hypertension), benign 11/17/2015    Paroxysmal VT ([facility] [facility] 11/17/2015       Syncope with paroxysmal episodes of VT and easily inducible VT on EP   study. ICD implanted Sept 09.          Coronary atherosclerosis of native coronary vessel 11/17/2015       [facility] 2009: moderate CAD          Syncope and collapse 11/17/2015       Micturition syncope Dec 09. Repeat episode of syncope 10/23/11 and again   Feb 2012. Taken to ER, but not admitted. Seen by neurology and given a   seizure med.   No syncope since.           ED (erectile dysfunction) 11/17/2015    Type II diabetes mellitus, uncontrolled ([facility] 11/17/2015    Paroxysmal atrial fibrillation (HCC) 11/17/2015    HLD (hyperlipidemia) 11/17/2015    Tobacco abuse disorder 72/73/4960    Systolic CHF, chronic ([facility] 11/17/2015       Echo 1/15: EF 55%, mild MR  Echo 2009: EF 25%            Past Surgical History         Past Surgical History:   Procedure Laterality Date    CARDIAC DEFIBRILLATOR PLACEMENT   2009     icd    COLONOSCOPY        OTHER SURGICAL HISTORY   Feb, March 2012     ANSELMO    MI CARDIAC SURG PROCEDURE UNLIST             Family History         Family History   Problem Relation Age of Onset    Heart Disease Brother      Hypertension Brother      Diabetes Brother      Hypertension Father      Diabetes Mother      Hypertension Mother      Diabetes Paternal Grandmother      Diabetes Paternal Grandfather           Social History            Tobacco Use    Smoking status: Current Some Day Smoker       Packs/day: 0.50    Smokeless tobacco: Never Used   Substance Use Topics    Alcohol use: Not Currently       Alcohol/week: 0.0 standard drinks            ROS:     Constitutional:   Negative for fevers and unexplained weight loss. Eyes:   Negative for visual disturbance. ENT:   Negative for significant hearing loss and tinnitus. Respiratory:   Negative for hemoptysis. Cardiovascular:   Negative except as noted in HPI. Gastrointestinal:   Negative for melena and abdominal pain. Genitourinary:   Negative for hematuria, renal stones. Integumentary:   Negative for rash or non-healing wounds  Hematologic/Lymphatic:   Negative for excessive bleeding hx or clotting disorder. Musculoskeletal:  Negative for active, unexplained/severe joint pain. Neurological:   Negative for stroke. Behavioral/Psych:   Negative for suicidal ideations.    Endocrine:   Negative for uncontrolled diabetic symptoms including polyuria, polydipsia and poor wound healing.            PHYSICAL EXAM:      /86   Pulse 76   Ht 5' 10\" (1.778 m)   Wt 188 lb 3.2 oz (85.4 kg)   BMI 27.00 kg/m²    General/Constitutional:   Alert and oriented x 3, no acute distress  HEENT:   normocephalic, atraumatic, moist mucous membranes  Neck:   No JVD or carotid bruits bilaterally  Cardiovascular:   regular rate and rhythm, no murmur/rub/gallop appreciated  Pulmonary:   clear to auscultation bilaterally, no respiratory distress  Abdomen:   soft, non-tender, non-distended  Ext:   No sig LE edema bilaterally  Skin:  warm and dry, no obvious rashes seen  Neuro:   no obvious sensory or motor deficits  Psychiatric:   normal mood and affect              Lab Results   Component Value Date      07/20/2020     K 4.0 07/20/2020      07/20/2020     CO2 28 07/20/2020     BUN 20 07/20/2020     CREATININE 1.09 07/20/2020     GLUCOSE 111 07/20/2020   CALCIUM 9.1 07/20/2020               Lab Results   Component Value Date     WBC 5.5 07/20/2020     HGB 14.8 07/20/2020     HCT 44.1 07/20/2020     MCV 97.6 07/20/2020      (L) 07/20/2020               Lab Results   Component Value Date     TSH 1.300 07/20/2020               Lab Results   Component Value Date     LABA1C 6.7 (H) 07/20/2020            Lab Results   Component Value Date      07/20/2020               Lab Results   Component Value Date     CHOL 156 07/20/2020            Lab Results   Component Value Date     TRIG 75 07/20/2020            Lab Results   Component Value Date     HDL 40 07/20/2020      Lab Results   Component Value Date     LDLCALC 101 (H) 07/20/2020            Lab Results   Component Value Date     LABVLDL 15 07/20/2020            Lab Results   Component Value Date     CHOLHDLRATIO 3.9 07/20/2020               I have Independently reviewed prior care notes, any ER records available, cardiac testing, labs and results with the patient and before seeing the patient today. Also independently reviewed outside records when available.         ASSESSMENT:     Andreas was seen today for congestive heart failure and results.     Diagnoses and all orders for this visit:     AICD (automatic cardioverter/defibrillator) present     HTN (hypertension), benign     Paroxysmal VT ([address]     Atherosclerosis of native coronary artery of native heart without angina pectoris     NICM (nonischemic cardiomyopathy) (HCC)     Paroxysmal atrial fibrillation (HCC)     Systolic CHF, chronic (HCC)     PVD (peripheral vascular disease) ([facility]  -     Case Request Invasive Vascular Procedures  -     Comprehensive Metabolic Panel; Future  -     CBC; Future  -     Magnesium;  Future     Tobacco abuse disorder              PLAN:    1. CSHF/NICM: EF 30%, RV pacing < 1%      Have held Ace/ARB in the past with CKD, will not change for now.    Remain on coreg.  Seems better now.  Stopped all alcohol, this has helped more than anything I believe.       2. PAF:  Follow for more pAF, he likes just being on ASA, CVA risk reviewed in the past.  Follow on device checks.      3. CAD:   Remain on ASA, statin and BB.     The patient has been instructed to call with any angina or equivalent as reviewed today. All questions were answered with the patient voicing complete understanding.        4. VT/ICD:  Follow in device clinic.       5. HTN:  Follow for now.       6. Tobacco use.   Needs to stop smoking, off alcohol now.    Reviewed today.        7. HPL: remain on lipitor       8. Claudication:  abnormal duplex, plan on LE cath and PPI as needed. Will follow AAA as well. Left leg pain worsening now.      Plan for Catheterization and possible Percutaneous  Intervention (PPI) at [facility] due to Worsening leg pains within the last 2 mos as described in HPI. Discussed risk of catheterization and potential PPI with the patient in detail. These risks include, but are not limited to, bleeding, stroke, heart attack, cardiac arrhythmias, allergic reactions, atheroemboli, acute kidney injury and cardiac arrest/death. Local complications at the site of catheter insertion were also reviewed and discussed. The patient voiced complete understanding about these risks. The patient agrees to proceed with the aforementioned associated risks.     Check labs beforehand.            Patient has been instructed and agrees to call our office with any issues or other concerns related to their cardiac condition(s) and/or complaint(s).           Return for Return After Test.         BESSY CAMACHO DO  6/27/2022                       Office Visit on 6/27/2022           Office Visit on 6/27/2022                Detailed Report            Note shared with patient        Progress Notes Info    Author Note Status Last Update User Last Update Date/Time   Korin Sinclair DO Signed Korin Sinclair DO 6/27/2022  8:51 AM     Chart Review Routing History    No routing history on file.

## 2022-07-01 NOTE — Clinical Note
Vessel(s): left iliac artery, left CFA, left PFA, left SFA, left popliteal artery, left PTA, left peroneal artery, left GABRIELLA and left tibio-peroneal trunk. Injected with power injection. Single view taken.

## 2022-07-01 NOTE — PROGRESS NOTES
TRANSFER - OUT REPORT:    Verbal report given to RN on Ursula Cartwright  being transferred to [address] [place] [address] for routine progression of patient care       Report consisted of patient's Situation, Background, Assessment and   Recommendations(SBAR). Information from the following report(s) Nurse Handoff Report was reviewed with the receiving nurse.     LLE stent placement with Dr. Lisa Agee  1 stent to SFA LLE  RFA 6 Fr angioseal  Angiomax bolus and gtt - finish bag.   2 mg versed  600 mg plavix  Mylanta

## 2022-07-01 NOTE — Clinical Note
Vessel(s): right iliac artery, right CFA, right PFA, right SFA, right popliteal artery, right PTA, right peroneal artery, right GABRIELLA and right tibio-peroneal trunk. Injected with power injection. Single view taken.

## 2022-07-01 NOTE — Clinical Note
Contrast Dose Calculator:   Patient's age: 73.   Patient's sex: Male. Patient weight (kg) = 85.3. Creatinine level (mg/dL) = 1.2. Creatinine clearance (mL/min): 74.05. Contrast concentration (mg/mL) = 370. MACD = 300 mL. Max Contrast dose per Creatinine Cl calculator = 166.6 mL.

## 2022-07-01 NOTE — DISCHARGE SUMMARY
[facility] Discharge Summary     Patient ID:  Madhu Vázquez  [id number]  73 y.o.  1955    Admit date: 7/1/2022    Discharge date:  07/01/22     Admitting Physician: Michelle Mina MD     Discharge Physician: GIBRAN Leroy CNP/Dr. Ania Trimble    Admission Diagnoses: Peripheral artery disease [facility]) [I73.9]    Discharge Diagnoses:     Principal Problem:    PVD (peripheral vascular disease) ([address]  Resolved Problems:    * No resolved hospital problems. *        Cardiology Procedures this admission:  NAEEM/LEI  Consults: none    Hospital Course: Patient was seen at the office of [facility] for complaints of claudication and was subsequently scheduled for a NAEEM with possible LEI at [facility] on 07/01/22 Patient underwent NAEEM/LEI catheterization by Dr. Ania Trimble that showed the following:     Right iliac system  Common iliac internal and external iliac arteries are all patent with mild to moderate nonobstructive plaque formation and no evidence of any significant stenosis     Right superficial femoral artery system  Femoral artery superficial femoral artery and profunda artery are all patent there is diffuse mild nonobstructive 30% plaquing noted in the SFA. Popliteal artery is patent the tibial peroneal trunk is patent the anterior tibial posterior tibial and popliteal arteries are patent they all do maintain mild nonobstructive disease     Left iliac system  Common iliac internal and external iliac arteries are patent with mild atherosclerotic disease     Left femoral arterial system  Femoral artery and profunda femoris are patent with no significant disease. The superficial femoral artery has mild to moderate plaque formation with a high-grade 80 to 90% stenosis in the midportion of the artery. The popliteal artery is patent with mild to moderate plaque formation again nonobstructive the tibial peroneal trunk is patent the anterior tibial artery is patent.   The tibial peroneal trunk appears to have moderate 60% disease but there is flow into the peroneal and posterior tibial artery     Intervention is performed on the left mid SFA using Angiomax as an anticoagulant a combination of a Glidewire advantage for initial sheath placement and initial balloon placement was used this was exchanged out for 300 sonometer Storq wire angioplasty of the lesion was performed with a 5 x40 balloon stenting was performed with a 6x40 absolute Pro stent which was then postdilated with the Prideaux balloon with excellent results     Patient will be on dual antiplatelet therapy with aspirin and Plavix     Angio-Seal closure of the arteriotomy access site was performed without complication    The day of discharge the patient was up feeling well without any complaints of chest pain or shortness of breath. Patient's right femoral cath site was clean, dry and intact without hematoma or bruit. Patient's labs were WNL. Patient was seen and examined by Dr. Dennis Sanchez and determined stable and ready for discharge. Patient was instructed on the importance of medication compliance. The patient will remain on dual anti-platelet therapy for 1 year. For the OMT of PAD the patient patient will be on the following regiment:  The patient will be on a high intensity statin therapy. . ASA and Plavix. The patient will follow up with [facility]. DISPOSITION: The patient is being discharged home in stable condition on a low saturated fat, low cholesterol and low salt diet. The patient is instructed to advance activities as tolerated to the limit of fatigue or shortness of breath. The patient is instructed to avoid all heavy lifting, straining, stooping or squatting for 3-5 days. The patient is instructed to watch the cath site for bleeding/oozing; if seen, the patient is instructed to apply firm pressure with a clean cloth and call [facility] at [phone].  The patient is instructed to watch for signs of infection which include: increasing area of redness, fever/hot to touch or purulent drainage at the catheterization site. The patient is instructed not to soak in a bathtub for 7-10 days, but is cleared to shower. The patient is instructed to call the office or return to the ER for immediate evaluation for any shortness of breath or chest pain not relieved by NTG. Discharge Exam: BP (!) 140/86   Pulse 71   Temp 98.1 °F (36.7 °C)   Resp 16   SpO2 100%       Patient has been seen by Dr. Angelique Dugan: see his progress note for exam details. No results found for this or any previous visit (from the past 24 hour(s)). Patient Instructions:     Current Discharge Medication List      START taking these medications    Details   clopidogrel (PLAVIX) 75 MG tablet Take 1 tablet by mouth daily  Qty: 90 tablet, Refills: 3         CONTINUE these medications which have NOT CHANGED    Details   amLODIPine (NORVASC) 10 MG tablet Take by mouth daily      aspirin 81 MG EC tablet Take 81 mg by mouth daily      atorvastatin (LIPITOR) 40 MG tablet Take 40 mg by mouth daily      carvedilol (COREG) 12.5 MG tablet Take 12.5 mg by mouth 2 times daily (with meals)      diclofenac sodium (VOLTAREN) 1 % GEL Apply 4 g topically 4 times daily      HYDROcodone-acetaminophen (NORCO) 5-325 MG per tablet Take 1 tablet by mouth every 4 hours as needed.               Signed:  Marika Vásquez APRN - CNP-BC  7/1/2022  11:52 AM

## 2022-07-01 NOTE — PROGRESS NOTES
Pt arrived, ambulated to room with no visible problems, planned NAEEM/LEI for Dr Luke Maldonado. Consent signed, Procedure discussed with pt all questions answered voiced understanding. Medications and history discussed with pt.     Pt prepped per ordersThe patient has a fraility score of 4-VULNERABLE, based on May need some assistance      Patient took Aspirin 324mg  today at 0600 prior to arrival.

## 2022-07-01 NOTE — PROGRESS NOTES
Report received from [address] [place] [address]. Procedural finding communicated. Intra procedural medication administration reviewed. Progression of care discussed. Patient received into CPRU room 7, Post LEI    Access site without bleeding or swelling. Right groin    Patient instructed to limit movement of right lower extremity. Routine post procedural vital signs & site assessment initiated.

## 2022-07-01 NOTE — Clinical Note
Prepped: bilateral groin. Prepped with: ChloraPrep.  Wet prep solution applied at: 7/1/2022 3:00 AM.

## 2022-07-01 NOTE — Clinical Note
Accessed site: right femoral artery. Femoral access needle used. Using fluoro guidance. Number of attempts: 1. Accessed successfully.

## 2022-07-21 ENCOUNTER — OFFICE VISIT (OUTPATIENT)
Dept: CARDIOLOGY CLINIC | Age: 67
End: 2022-07-21
Payer: MEDICARE

## 2022-07-21 VITALS
HEART RATE: 78 BPM | SYSTOLIC BLOOD PRESSURE: 120 MMHG | BODY MASS INDEX: 26.63 KG/M2 | DIASTOLIC BLOOD PRESSURE: 84 MMHG | WEIGHT: 186 LBS | HEIGHT: 70 IN

## 2022-07-21 DIAGNOSIS — I48.0 PAROXYSMAL ATRIAL FIBRILLATION (HCC): ICD-10-CM

## 2022-07-21 DIAGNOSIS — I42.8 NICM (NONISCHEMIC CARDIOMYOPATHY) (HCC): ICD-10-CM

## 2022-07-21 DIAGNOSIS — Z95.810 AICD (AUTOMATIC CARDIOVERTER/DEFIBRILLATOR) PRESENT: Primary | ICD-10-CM

## 2022-07-21 DIAGNOSIS — Z09 HOSPITAL DISCHARGE FOLLOW-UP: ICD-10-CM

## 2022-07-21 DIAGNOSIS — I73.9 PVD (PERIPHERAL VASCULAR DISEASE) (HCC): ICD-10-CM

## 2022-07-21 DIAGNOSIS — I47.29 PAROXYSMAL VT: ICD-10-CM

## 2022-07-21 DIAGNOSIS — I50.22 SYSTOLIC CHF, CHRONIC (HCC): ICD-10-CM

## 2022-07-21 LAB
ALBUMIN SERPL-MCNC: 4.1 G/DL (ref 3.2–4.6)
ALBUMIN/GLOB SERPL: 1.1 {RATIO} (ref 1.2–3.5)
ALP SERPL-CCNC: 70 U/L (ref 50–136)
ALT SERPL-CCNC: 28 U/L (ref 12–65)
ANION GAP SERPL CALC-SCNC: 0 MMOL/L (ref 7–16)
AST SERPL-CCNC: 18 U/L (ref 15–37)
BILIRUB SERPL-MCNC: 0.4 MG/DL (ref 0.2–1.1)
BUN SERPL-MCNC: 17 MG/DL (ref 8–23)
CALCIUM SERPL-MCNC: 9.3 MG/DL (ref 8.3–10.4)
CHLORIDE SERPL-SCNC: 108 MMOL/L (ref 98–107)
CHOLEST SERPL-MCNC: 166 MG/DL
CO2 SERPL-SCNC: 31 MMOL/L (ref 21–32)
CREAT SERPL-MCNC: 1.1 MG/DL (ref 0.8–1.5)
GLOBULIN SER CALC-MCNC: 3.6 G/DL (ref 2.3–3.5)
GLUCOSE SERPL-MCNC: 113 MG/DL (ref 65–100)
HDLC SERPL-MCNC: 38 MG/DL (ref 40–60)
HDLC SERPL: 4.4 {RATIO}
LDLC SERPL CALC-MCNC: 99.8 MG/DL
POTASSIUM SERPL-SCNC: 4.7 MMOL/L (ref 3.5–5.1)
PROT SERPL-MCNC: 7.7 G/DL (ref 6.3–8.2)
SODIUM SERPL-SCNC: 139 MMOL/L (ref 138–145)
TRIGL SERPL-MCNC: 141 MG/DL (ref 35–150)
TSH, 3RD GENERATION: 0.84 UIU/ML (ref 0.36–3.74)
VLDLC SERPL CALC-MCNC: 28.2 MG/DL (ref 6–23)

## 2022-07-21 PROCEDURE — 1123F ACP DISCUSS/DSCN MKR DOCD: CPT | Performed by: INTERNAL MEDICINE

## 2022-07-21 PROCEDURE — 99214 OFFICE O/P EST MOD 30 MIN: CPT | Performed by: INTERNAL MEDICINE

## 2022-07-21 PROCEDURE — 1111F DSCHRG MED/CURRENT MED MERGE: CPT | Performed by: INTERNAL MEDICINE

## 2022-07-21 NOTE — PROGRESS NOTES
[address], [address] [facility], [address] [place] [address]  PHONE: [phone]     22    NAME:  Alvarado Davis  : 1955  MRN: [id number]       SUBJECTIVE:   Alvarado Davis is a 73 y.o. male seen for a follow up visit regarding the following:     Chief Complaint   Patient presents with    Follow-Up from Hospital     PAD       HPI: Here for CV, SHF/NICM follow up,   [address] [facility]  with mod CAD, ICD  after syncope and VT   cSHF (EF 25% )   pAF (likes being just on ASA). Echo 3/2019: EF 40-45%   Echo 2022: EF 30%  LE Stent placement: 2022      [facility] Admission 2018: alcohol induced pancreatitis. His stay was complicated with an ileus and delirium from alcohol withdrawal.     Doing well since stent placement, walking some. No new COLES. No CP, pressure. Stopped alcohol, less smoking. He has NYHA Class II sx now. Patient denies recent history of orthopnea, PND, excessive dizziness and/or syncope. Past Medical History, Past Surgical History, Family history, Social History, and Medications were all reviewed with the patient today and updated as necessary. Current Outpatient Medications   Medication Sig Dispense Refill    clopidogrel (PLAVIX) 75 MG tablet Take 1 tablet by mouth daily 90 tablet 3    amLODIPine (NORVASC) 10 MG tablet Take by mouth daily      aspirin 81 MG EC tablet Take 81 mg by mouth daily      atorvastatin (LIPITOR) 40 MG tablet Take 40 mg by mouth daily      carvedilol (COREG) 12.5 MG tablet Take 12.5 mg by mouth 2 times daily (with meals)      diclofenac sodium (VOLTAREN) 1 % GEL Apply 4 g topically 4 times daily      HYDROcodone-acetaminophen (NORCO) 5-325 MG per tablet Take 1 tablet by mouth every 4 hours as needed. No current facility-administered medications for this visit.         No Known Allergies  Patient Active Problem List    Diagnosis Date Noted    PVD (peripheral vascular disease) ([facility] 2022     Priority: Medium    Claudication ([facility] [address] 2022 NICM (nonischemic cardiomyopathy) ([facility] [address] 12/27/2017    AICD (automatic cardioverter/defibrillator) present 11/17/2015    HTN (hypertension), benign 11/17/2015    Paroxysmal VT ([facility] [address] 11/17/2015     Syncope with paroxysmal episodes of VT and easily inducible VT on EP   study. ICD implanted Sept 09. Coronary atherosclerosis of native coronary vessel 11/17/2015     [facility] 2009: moderate CAD        Syncope and collapse 11/17/2015     Micturition syncope Dec 09. Repeat episode of syncope 10/23/11 and again   Feb 2012. Taken to ER, but not admitted. Seen by neurology and given a   seizure med. No syncope since.          ED (erectile dysfunction) 11/17/2015    Type II diabetes mellitus, uncontrolled ([facility] 11/17/2015    Paroxysmal atrial fibrillation ([facility] 11/17/2015    HLD (hyperlipidemia) 11/17/2015    Tobacco abuse disorder 96/32/4638    Systolic CHF, chronic ([facility] 11/17/2015     Echo 1/15: EF 55%, mild MR  Echo 2009: EF 25%          Past Surgical History:   Procedure Laterality Date    CARDIAC DEFIBRILLATOR PLACEMENT  2009    icd    COLONOSCOPY      INVASIVE VASCULAR N/A 7/1/2022    ANGIOGRAPHY LOWER EXT BILAT performed by Kay Flood MD at [address] [place] [address] CATH LAB    INVASIVE VASCULAR N/A 7/1/2022    Angioplasty peripheral artery performed by Kay Flood MD at [address] [place] [address] CATH LAB    INVASIVE VASCULAR N/A 7/1/2022    Insert stent peripheral artery performed by Kay Flood MD at [address] [place] [address] CATH LAB    OTHER SURGICAL HISTORY  Feb, March 2012    ANSELMO    [place] CARDIAC SURG PROCEDURE UNLIST       Family History   Problem Relation Age of Onset    Heart Disease Brother     Hypertension Brother     Diabetes Brother     Hypertension Father     Diabetes Mother     Hypertension Mother     Diabetes Paternal Grandmother     Diabetes Paternal Grandfather      Social History     Tobacco Use    Smoking status: Some Days     Packs/day: 0.50     Types: Cigarettes    Smokeless tobacco: Never   Substance Use Topics Alcohol use: Not Currently     Alcohol/week: 0.0 standard drinks         ROS:    No obvious pertinent positives on review of systems except for what was outlined in the HPI today.       PHYSICAL EXAM:     /84   Pulse 78   Ht 5' 10\" (1.778 m)   Wt 186 lb (84.4 kg)   BMI 26.69 kg/m²    General/Constitutional:   Alert and oriented x 3, no acute distress  HEENT:   normocephalic, atraumatic, moist mucous membranes  Neck:   No JVD or carotid bruits bilaterally  Cardiovascular:   regular rate and rhythm, no murmur/rub/gallop appreciated  Pulmonary:   clear to auscultation bilaterally, no respiratory distress  Abdomen:   soft, non-tender, non-distended  Ext:   No sig LE edema bilaterally  Skin:  warm and dry, no obvious rashes seen  Neuro:   no obvious sensory or motor deficits  Psychiatric:   normal mood and affect      Lab Results   Component Value Date/Time     06/27/2022 09:59 AM    K 4.0 06/27/2022 09:59 AM     06/27/2022 09:59 AM    CO2 27 06/27/2022 09:59 AM    BUN 15 06/27/2022 09:59 AM    CREATININE 1.20 06/27/2022 09:59 AM    GLUCOSE 70 06/27/2022 09:59 AM    CALCIUM 9.6 06/27/2022 09:59 AM        Lab Results   Component Value Date    WBC 5.9 06/27/2022    HGB 16.6 06/27/2022    HCT 51.8 (H) 06/27/2022    MCV 99.8 (H) 06/27/2022     06/27/2022       Lab Results   Component Value Date    TSH 1.300 07/20/2020       Lab Results   Component Value Date    LABA1C 6.7 (H) 07/20/2020     Lab Results   Component Value Date     07/20/2020       Lab Results   Component Value Date    CHOL 156 07/20/2020     Lab Results   Component Value Date    TRIG 75 07/20/2020     Lab Results   Component Value Date    HDL 40 07/20/2020     Lab Results   Component Value Date    LDLCALC 101 (H) 07/20/2020     Lab Results   Component Value Date    LABVLDL 15 07/20/2020     Lab Results   Component Value Date    CHOLHDLRATIO 3.9 07/20/2020           I have Independently reviewed prior care notes, any ER records available, cardiac testing, labs and results with the patient and before seeing the patient today. Also independently reviewed outside records when available. ASSESSMENT:    Isabella Hogan was seen today for follow-up from hospital.    Diagnoses and all orders for this visit:    AICD (automatic cardioverter/defibrillator) present    PVD (peripheral vascular disease) ([facility] [address]  -     Comprehensive Metabolic Panel; Future  -     Lipid Panel; Future  -     TSH; Future    Paroxysmal VT (HCC)    NICM (nonischemic cardiomyopathy) (HCC)    Paroxysmal atrial fibrillation (HCC)    Systolic CHF, chronic ([facility]  -     [address] [facility]        PLAN:    1. CSHF/NICM: EF 30%, RV pacing < 1%. Refer to cardiac rehab, follow for angina. Hold on LHC for now. Have held Ace/ARB in the past with CKD, will not change for now. Remain on coreg. Seems better now. Stopped all alcohol, this has helped more than anything I believe. 2. PAF:  Follow for more pAF, he likes just being on ASA, CVA risk reviewed in the past.  Follow on device checks. 3. CAD:   Remain on ASA, statin and BB. The patient has been instructed to call with any angina or equivalent as reviewed today. All questions were answered with the patient voicing complete understanding. 4. VT/ICD:  Follow in device clinic. 5. HTN:  Follow for now. 6. Tobacco use. Needs to stop smoking, off alcohol now. Reviewed today. 7. HPL: remain on lipitor. Check labs today. 8. PVD:  s/p PPI, REMAIN on DAPT. Remain on DAPT, statin. Will follow AAA as well. Patient has been instructed and agrees to call our office with any issues or other concerns related to their cardiac condition(s) and/or complaint(s). Return in about 3 months (around 10/21/2022).        BESSY CAMACHO, DO  7/21/2022

## 2022-07-22 DIAGNOSIS — E78.5 HLD (HYPERLIPIDEMIA): ICD-10-CM

## 2022-07-22 DIAGNOSIS — I73.9 PVD (PERIPHERAL VASCULAR DISEASE) (HCC): Primary | ICD-10-CM

## 2022-07-22 NOTE — TELEPHONE ENCOUNTER
Spoke to pt made him aware per Dr Nadege Lazar Please call him, lipids still a bit high. Stop the lipitor. Start crestor 20 qhs. Focus on diet and exercise. Needs CMP and lipids 1-2 days before seeing me in the fall. Call for issues. Remain on DAPT. Pt verbalized understanding.

## 2022-07-22 NOTE — TELEPHONE ENCOUNTER
----- Message from Sharmila Nayak DO sent at 7/21/2022  2:31 PM EDT -----  Please call him, lipids still a bit high. Stop the lipitor. Start crestor 20 qhs. Focus on diet and exercise. Needs CMP and lipids 1-2 days before seeing me in the fall. Call for issues. Remain on DAPT.     Thanks

## 2022-07-23 RX ORDER — ROSUVASTATIN CALCIUM 20 MG/1
20 TABLET, COATED ORAL NIGHTLY
Qty: 30 TABLET | Refills: 3 | Status: SHIPPED | OUTPATIENT
Start: 2022-07-23

## 2022-10-12 DIAGNOSIS — Z95.810 AICD (AUTOMATIC CARDIOVERTER/DEFIBRILLATOR) PRESENT: Primary | ICD-10-CM

## 2022-10-12 DIAGNOSIS — I47.29 PAROXYSMAL VT: ICD-10-CM

## 2022-10-12 DIAGNOSIS — I48.0 PAROXYSMAL ATRIAL FIBRILLATION (HCC): ICD-10-CM

## 2022-10-21 ENCOUNTER — OFFICE VISIT (OUTPATIENT)
Dept: CARDIOLOGY CLINIC | Age: 67
End: 2022-10-21
Payer: MEDICARE

## 2022-10-21 VITALS
HEART RATE: 84 BPM | HEIGHT: 70 IN | WEIGHT: 190 LBS | DIASTOLIC BLOOD PRESSURE: 82 MMHG | SYSTOLIC BLOOD PRESSURE: 120 MMHG | BODY MASS INDEX: 27.2 KG/M2

## 2022-10-21 DIAGNOSIS — I73.9 PVD (PERIPHERAL VASCULAR DISEASE) (HCC): ICD-10-CM

## 2022-10-21 DIAGNOSIS — I10 HTN (HYPERTENSION), BENIGN: Primary | ICD-10-CM

## 2022-10-21 DIAGNOSIS — I25.10 ATHEROSCLEROSIS OF NATIVE CORONARY ARTERY OF NATIVE HEART WITHOUT ANGINA PECTORIS: ICD-10-CM

## 2022-10-21 DIAGNOSIS — Z72.0 TOBACCO ABUSE DISORDER: ICD-10-CM

## 2022-10-21 DIAGNOSIS — Z95.810 AICD (AUTOMATIC CARDIOVERTER/DEFIBRILLATOR) PRESENT: ICD-10-CM

## 2022-10-21 DIAGNOSIS — I42.8 NICM (NONISCHEMIC CARDIOMYOPATHY) (HCC): ICD-10-CM

## 2022-10-21 DIAGNOSIS — I50.22 SYSTOLIC CHF, CHRONIC (HCC): ICD-10-CM

## 2022-10-21 DIAGNOSIS — I48.0 PAROXYSMAL ATRIAL FIBRILLATION (HCC): ICD-10-CM

## 2022-10-21 PROCEDURE — 99214 OFFICE O/P EST MOD 30 MIN: CPT | Performed by: INTERNAL MEDICINE

## 2022-10-21 PROCEDURE — 1123F ACP DISCUSS/DSCN MKR DOCD: CPT | Performed by: INTERNAL MEDICINE

## 2022-10-21 NOTE — PROGRESS NOTES
[address], [address] [facility], [address] [place] [address]  PHONE: [phone]     10/21/22    NAME:  Nena Chavis  : 1955  MRN: [id number]       SUBJECTIVE:   Nena Chavis is a 73 y.o. male seen for a follow up visit regarding the following:     Chief Complaint   Patient presents with    Irregular Heart Beat       HPI: Here for CV, SHF/NICM follow up,   [facility]  with mod CAD, ICD  after syncope and VT   cSHF (EF 25% )   pAF (likes being just on ASA). Echo 3/2019: EF 40-45%   Echo 2022: EF 30%  LE Stent placement: 2022      [facility]: alcohol induced pancreatitis. His stay was complicated with an ileus and delirium from alcohol withdrawal.      In [employer] now, doing well now, on angina. No CP. No new COLES. No CP, pressure. Stopped alcohol, less smoking. He has NYHA Class II sx now. Patient denies recent history of orthopnea, PND, excessive dizziness and/or syncope. Past Medical History, Past Surgical History, Family history, Social History, and Medications were all reviewed with the patient today and updated as necessary. Current Outpatient Medications   Medication Sig Dispense Refill    rosuvastatin (CRESTOR) 20 MG tablet Take 1 tablet by mouth nightly 30 tablet 3    clopidogrel (PLAVIX) 75 MG tablet Take 1 tablet by mouth daily 90 tablet 3    amLODIPine (NORVASC) 10 MG tablet Take by mouth daily      aspirin 81 MG EC tablet Take 81 mg by mouth daily      atorvastatin (LIPITOR) 40 MG tablet Take 40 mg by mouth daily      carvedilol (COREG) 12.5 MG tablet Take 12.5 mg by mouth 2 times daily (with meals)      diclofenac sodium (VOLTAREN) 1 % GEL Apply 4 g topically 4 times daily      HYDROcodone-acetaminophen (NORCO) 5-325 MG per tablet Take 1 tablet by mouth every 4 hours as needed. No current facility-administered medications for this visit.         Allergies   Allergen Reactions    Other Other (See Comments)     ants     Patient Active Problem List Diagnosis Date Noted    PVD (peripheral vascular disease) ([facility] [address] 06/27/2022     Priority: Medium    Claudication ([facility] [address] 05/18/2022    NICM (nonischemic cardiomyopathy) ([facility] [address] 12/27/2017    AICD (automatic cardioverter/defibrillator) present 11/17/2015    HTN (hypertension), benign 11/17/2015    Paroxysmal VT 11/17/2015     Syncope with paroxysmal episodes of VT and easily inducible VT on EP   study. ICD implanted Sept 09. Coronary atherosclerosis of native coronary vessel 11/17/2015     [facility] 2009: moderate CAD        Syncope and collapse 11/17/2015     Micturition syncope Dec 09. Repeat episode of syncope 10/23/11 and again   Feb 2012. Taken to ER, but not admitted. Seen by neurology and given a   seizure med. No syncope since.          ED (erectile dysfunction) 11/17/2015    Type II diabetes mellitus, uncontrolled 11/17/2015    Paroxysmal atrial fibrillation ([facility] [address] 11/17/2015    HLD (hyperlipidemia) 11/17/2015    Tobacco abuse disorder 18/80/8295    Systolic CHF, chronic ([facility] [address] 11/17/2015     Echo 1/15: EF 55%, mild MR  Echo 2009: EF 25%          Past Surgical History:   Procedure Laterality Date    CARDIAC DEFIBRILLATOR PLACEMENT  2009    icd    COLONOSCOPY      INVASIVE VASCULAR N/A 7/1/2022    ANGIOGRAPHY LOWER EXT BILAT performed by Martell Duane, MD at [address] [place] [address] CATH LAB    INVASIVE VASCULAR N/A 7/1/2022    Angioplasty peripheral artery performed by Martell Duane, MD at [address] [place] [address] CATH LAB    INVASIVE VASCULAR N/A 7/1/2022    Insert stent peripheral artery performed by Martell Duane, MD at [address] [place] [address] CATH LAB    OTHER SURGICAL HISTORY  Feb, March 2012    [facility]    [place] CARDIAC SURG PROCEDURE UNLIST       Family History   Problem Relation Age of Onset    Heart Disease Brother     Hypertension Brother     Diabetes Brother     Hypertension Father     Diabetes Mother     Hypertension Mother     Diabetes Paternal Grandmother     Diabetes Paternal Grandfather      Social History     Tobacco Use    Smoking status: Some Days     Packs/day: 0.50     Types: Cigarettes    Smokeless tobacco: Never   Substance Use Topics    Alcohol use: Not Currently     Alcohol/week: 0.0 standard drinks         ROS:    No obvious pertinent positives on review of systems except for what was outlined in the HPI today.       PHYSICAL EXAM:     /82   Pulse 84   Ht 5' 10\" (1.778 m)   Wt 190 lb (86.2 kg)   BMI 27.26 kg/m²    General/Constitutional:   Alert and oriented x 3, no acute distress  HEENT:   normocephalic, atraumatic, moist mucous membranes  Neck:   No JVD or carotid bruits bilaterally  Cardiovascular:   regular rate and rhythm, no murmur/rub/gallop appreciated  Pulmonary:   clear to auscultation bilaterally, no respiratory distress  Abdomen:   soft, non-tender, non-distended  Ext:   No sig LE edema bilaterally  Skin:  warm and dry, no obvious rashes seen  Neuro:   no obvious sensory or motor deficits  Psychiatric:   normal mood and affect      Lab Results   Component Value Date/Time     07/21/2022 10:44 AM    K 4.7 07/21/2022 10:44 AM     07/21/2022 10:44 AM    CO2 31 07/21/2022 10:44 AM    BUN 17 07/21/2022 10:44 AM    CREATININE 1.10 07/21/2022 10:44 AM    GLUCOSE 113 07/21/2022 10:44 AM    CALCIUM 9.3 07/21/2022 10:44 AM        Lab Results   Component Value Date    WBC 5.9 06/27/2022    HGB 16.6 06/27/2022    HCT 51.8 (H) 06/27/2022    MCV 99.8 (H) 06/27/2022     06/27/2022       Lab Results   Component Value Date    TSH 1.300 07/20/2020       Lab Results   Component Value Date    LABA1C 6.7 (H) 07/20/2020     Lab Results   Component Value Date     07/20/2020       Lab Results   Component Value Date    CHOL 166 07/21/2022    CHOL 156 07/20/2020     Lab Results   Component Value Date    TRIG 141 07/21/2022    TRIG 75 07/20/2020     Lab Results   Component Value Date    HDL 38 (L) 07/21/2022    HDL 40 07/20/2020     Lab Results   Component Value Date    LDLCALC 99.8 07/21/2022    LDLCALC 101 (H) 07/20/2020     Lab Results   Component Value Date    LABVLDL 28.2 (H) 07/21/2022    LABVLDL 15 07/20/2020     Lab Results   Component Value Date    CHOLHDLRATIO 4.4 07/21/2022    CHOLHDLRATIO 3.9 07/20/2020           I have Independently reviewed prior care notes, any ER records available, cardiac testing, labs and results with the patient and before seeing the patient today. Also independently reviewed outside records when available. ASSESSMENT:    Kaylie Bonilla was seen today for irregular heart beat. Diagnoses and all orders for this visit:    HTN (hypertension), benign    NICM (nonischemic cardiomyopathy) (HCC)    Paroxysmal atrial fibrillation (HCC)    PVD (peripheral vascular disease) ([facility] [address]    Systolic CHF, chronic ([facility] [address] [facility]    AICD (automatic cardioverter/defibrillator) present    Atherosclerosis of native coronary artery of native heart without angina pectoris    Tobacco abuse disorder        PLAN:    1. CSHF/NICM: EF 30%, in [employer] gym. Have held Ace/ARB in the past with CKD, will not change for now. Remain on coreg. Seems better now. Stopped all alcohol, this has helped more than anything I believe. 2. PAF:  Follow for more pAF, he likes just being on ASA, CVA risk reviewed in the past.  Follow on device checks. 3. CAD:   Remain on ASA, statin and BB. The patient has been instructed to call with any angina or equivalent as reviewed today. All questions were answered with the patient voicing complete understanding. 4. VT/ICD:  Follow in device clinic. Check today. 5. HTN:  Follow for now. 6. Tobacco use. Needs to stop smoking, off alcohol now. Reviewed today. 7. HPL: remain on lipitor. 8. PVD:  remain on DAPT and statin. Will follow AAA as well. Patient has been instructed and agrees to call our office with any issues or other concerns related to their cardiac condition(s) and/or complaint(s).         Return in about 6 months (around 4/21/2023).        BESSY CAMACHO,   10/21/2022

## 2022-10-22 ENCOUNTER — HOSPITAL ENCOUNTER (EMERGENCY)
Age: 67
Discharge: HOME | End: 2022-10-22
Attending: EMERGENCY MEDICINE
Payer: MEDICARE

## 2022-10-22 VITALS
SYSTOLIC BLOOD PRESSURE: 127 MMHG | DIASTOLIC BLOOD PRESSURE: 84 MMHG | OXYGEN SATURATION: 97 % | BODY MASS INDEX: 27.2 KG/M2 | HEIGHT: 70 IN | HEART RATE: 68 BPM | TEMPERATURE: 98.2 F | RESPIRATION RATE: 19 BRPM | WEIGHT: 190 LBS
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
## 2022-10-22 DIAGNOSIS — S10.96XA INSECT BITE, NONVENOMOUS OF FACE, NECK, AND SCALP EXCEPT EYE, INITIAL ENCOUNTER: Primary | ICD-10-CM

## 2022-10-22 DIAGNOSIS — S00.06XA INSECT BITE, NONVENOMOUS OF FACE, NECK, AND SCALP EXCEPT EYE, INITIAL ENCOUNTER: Primary | ICD-10-CM

## 2022-10-22 DIAGNOSIS — L29.9 SKIN PRURITUS: ICD-10-CM

## 2022-10-22 DIAGNOSIS — S00.86XA INSECT BITE, NONVENOMOUS OF FACE, NECK, AND SCALP EXCEPT EYE, INITIAL ENCOUNTER: Primary | ICD-10-CM

## 2022-10-22 PROCEDURE — 96375 TX/PRO/DX INJ NEW DRUG ADDON: CPT

## 2022-10-22 PROCEDURE — A4216 STERILE WATER/SALINE, 10 ML: HCPCS

## 2022-10-22 PROCEDURE — 99284 EMERGENCY DEPT VISIT MOD MDM: CPT

## 2022-10-22 PROCEDURE — 2500000003 HC RX 250 WO HCPCS

## 2022-10-22 PROCEDURE — 2580000003 HC RX 258

## 2022-10-22 PROCEDURE — 96374 THER/PROPH/DIAG INJ IV PUSH: CPT

## 2022-10-22 PROCEDURE — 6370000000 HC RX 637 (ALT 250 FOR IP)

## 2022-10-22 PROCEDURE — 6360000002 HC RX W HCPCS

## 2022-10-22 RX ORDER — DEXAMETHASONE SODIUM PHOSPHATE 10 MG/ML
10 INJECTION, SOLUTION INTRAMUSCULAR; INTRAVENOUS ONCE
Status: COMPLETED | OUTPATIENT
Start: 2022-10-22 | End: 2022-10-22

## 2022-10-22 RX ORDER — PREDNISONE 20 MG/1
20 TABLET ORAL 2 TIMES DAILY
Qty: 10 TABLET | Refills: 0 | Status: SHIPPED | OUTPATIENT
Start: 2022-10-22 | End: 2022-10-27

## 2022-10-22 RX ORDER — HYDROXYZINE HYDROCHLORIDE 25 MG/1
25 TABLET, FILM COATED ORAL EVERY 8 HOURS PRN
Qty: 30 TABLET | Refills: 0 | Status: SHIPPED | OUTPATIENT
Start: 2022-10-22 | End: 2022-11-01

## 2022-10-22 RX ORDER — HYDROXYZINE PAMOATE 25 MG/1
25 CAPSULE ORAL
Status: COMPLETED | OUTPATIENT
Start: 2022-10-22 | End: 2022-10-22

## 2022-10-22 RX ORDER — DIPHENHYDRAMINE HYDROCHLORIDE 50 MG/ML
25 INJECTION INTRAMUSCULAR; INTRAVENOUS
Status: COMPLETED | OUTPATIENT
Start: 2022-10-22 | End: 2022-10-22

## 2022-10-22 RX ADMIN — DEXAMETHASONE SODIUM PHOSPHATE 10 MG: 10 INJECTION, SOLUTION INTRAMUSCULAR; INTRAVENOUS at 13:10

## 2022-10-22 RX ADMIN — FAMOTIDINE 20 MG: 10 INJECTION, SOLUTION INTRAVENOUS at 13:05

## 2022-10-22 RX ADMIN — DIPHENHYDRAMINE HYDROCHLORIDE 25 MG: 50 INJECTION, SOLUTION INTRAMUSCULAR; INTRAVENOUS at 13:04

## 2022-10-22 RX ADMIN — HYDROXYZINE PAMOATE 25 MG: 25 CAPSULE ORAL at 14:35

## 2022-10-22 ASSESSMENT — ENCOUNTER SYMPTOMS
NAUSEA: 0
DIARRHEA: 0
VOMITING: 0
SHORTNESS OF BREATH: 0
ABDOMINAL PAIN: 0

## 2022-10-22 ASSESSMENT — PAIN - FUNCTIONAL ASSESSMENT: PAIN_FUNCTIONAL_ASSESSMENT: NONE - DENIES PAIN

## 2022-10-22 NOTE — ED PROVIDER NOTES
Emergency Department Provider Note                   PCP:                Tomasa Marcos MD               Age: 73 y.o. Sex: male       ICD-10-CM    1. Insect bite, nonvenomous of face, neck, and scalp except eye, initial encounter  S00.86XA     S00.06XA     S10.96XA     W57. XXXA       2. Skin pruritus  L29.9 predniSONE (DELTASONE) 20 MG tablet     hydrOXYzine HCl (ATARAX) 25 MG tablet          DISPOSITION Decision To Discharge 10/22/2022 02:41:38 PM        MDM  Number of Diagnoses or Management Options  Insect bite, nonvenomous of face, neck, and scalp except eye, initial encounter  Skin pruritus  Diagnosis management comments: Vital signs reviewed, patient stable, NAD, afebrile, nontoxic in appearance     Will administer IV Pepcid, Decadron, Benadryl  Patient states his wife can drive him home    Itching mildly resolved with Pepcid, Decadron, Benadryl patient given p.o. hydroxyzine and ice pack to place on neck    Rest of patient's physical exam is benign. Lungs are clear to auscultation bilaterally, no angioedema, uvula is midline. Appears that patient has several insect bites back of head and along left side of neck and also on right cheek. Look like discrete local reactions to each bite. Does not appear like urticaria. Do not suspect shingles at this time as raised areas across the midline. Based on history, physical exam, I do not feel any imaging or lab work is warranted at this time. Patient is stable and can be discharged home with outpatient follow-up primary care this coming week. I discussed physical exam findings, treatment and follow-up with the patient and those who were present. I answered any questions they had. They verbalized that they understood and were in agreement with treatment and disposition. I discussed signs and symptoms that would warrant a prompt return to the emergency department with the patient.   I included the signs and symptoms on discharge paperwork. Patient verbalized that they understood. Patient discharged home in stable condition. He is to follow-up with primary care next week. Reiterated strict return to ED precautions. Patient verbalized that he understood. Amount and/or Complexity of Data Reviewed  Review and summarize past medical records: yes  Independent visualization of images, tracings, or specimens: yes    Risk of Complications, Morbidity, and/or Mortality  Presenting problems: moderate  Diagnostic procedures: moderate  Management options: moderate    Patient Progress  Patient progress: stable             Orders Placed This Encounter   Procedures    Misc nursing order (specify)    Insert peripheral IV        Medications   diphenhydrAMINE (BENADRYL) injection 25 mg (25 mg IntraVENous Given 10/22/22 1304)   famotidine (PEPCID) 20 mg in sodium chloride (PF) 10 mL injection (20 mg IntraVENous Given 10/22/22 1305)   dexamethasone (PF) (DECADRON) injection 10 mg (10 mg IntraVENous Given 10/22/22 1310)   hydrOXYzine pamoate (VISTARIL) capsule 25 mg (25 mg Oral Given 10/22/22 1435)       Discharge Medication List as of 10/22/2022  2:42 PM        START taking these medications    Details   predniSONE (DELTASONE) 20 MG tablet Take 1 tablet by mouth 2 times daily for 5 days, Disp-10 tablet, R-0Print      hydrOXYzine HCl (ATARAX) 25 MG tablet Take 1 tablet by mouth every 8 hours as needed for Itching, Disp-30 tablet, R-0Print              Kaila Ledesma is a 73 y.o. male who presents to the Emergency Department with chief complaint of    Chief Complaint   Patient presents with    Rash    Insect Bite      41-year-old male with history of paroxysmal VT, hypertension, GERD, heart failure, CAD presents to the emergency department today with chief complaint of itchy rash on left side of neck beginning yesterday after being bitten or stung by something on the back of his neck.   Patient states he was sitting outside and he felt a stinging sensation on the back of his neck and had a itchy lump. Patient states that over the course of the night itching and hives spread on the left side of his neck and this morning hives were spreading to the right side of his neck and he had swelling under his right eyelid. Patient denies swelling of lips, tongue, wheezing, shortness of breath, chest tightness, nausea, vomiting, diarrhea, abdominal pain, headaches, fevers or chills. Patient is taken Benadryl twice without relief. Nothing makes patient's condition better. Benadryl tried without relief. The history is provided by the patient. No  was used. Review of Systems   Constitutional:  Negative for chills and fever. Respiratory:  Negative for shortness of breath. Cardiovascular:  Negative for chest pain. Gastrointestinal:  Negative for abdominal pain, diarrhea, nausea and vomiting. Skin:  Positive for rash. Neurological:  Negative for headaches. All other systems reviewed and are negative. Past Medical History:   Diagnosis Date    AICD (automatic cardioverter/defibrillator) present 11/17/2015    Anemia     Arrhythmia Sept. 2009    Syncope with paroxysmal episodes of VT and easily inducible VT on EP study. AICD-implanted cardiac defib. CAD (coronary artery disease) 2010    mi -- no stents    CAD (coronary artery disease)     cath done Sep. 2009 showed moderate, diffuse disease. No high grade areas of stenosis except for an occluded RCA.     Chronic combined systolic and diastolic CHF (congestive heart failure) ([address] 11/17/2015    Chronic obstructive pulmonary disease (HCC)     Chronic pain     Coronary atherosclerosis of native coronary vessel 11/17/2015    Depression     Diabetes ([address] 2011    type 2-- sqbs avg am --pt unsure --- does not check sqbs    Dyslipidemia     ED (erectile dysfunction) 11/17/2015    GERD (gastroesophageal reflux disease)     Heart failure ([address]     EF 40-45% on echo March 2010    HLD (hyperlipidemia) 11/17/2015    HTN (hypertension), benign 11/17/2015    Hypertension x 3 yrs    contolled with meds    Morbid obesity ([facility]     bmi=31    Paroxysmal atrial fibrillation ([facility] 11/17/2015    Paroxysmal VT 11/17/2015    Psychiatric disorder     Syncope     micturition syncope Dec. 2009. Repeat episode syncope 10-23-11.     Syncope and collapse 19/66/5622    Systolic CHF, chronic ([facility] 11/17/2015    Tobacco abuse disorder 11/17/2015    Type II diabetes mellitus, uncontrolled 11/17/2015        Past Surgical History:   Procedure Laterality Date    CARDIAC DEFIBRILLATOR PLACEMENT  2009    icd    COLONOSCOPY      INVASIVE VASCULAR N/A 7/1/2022    ANGIOGRAPHY LOWER EXT BILAT performed by Adonis Vela MD at [address] [place] [address] CATH LAB    INVASIVE VASCULAR N/A 7/1/2022    Angioplasty peripheral artery performed by Adonis Vela MD at [address] [place] [address] CATH LAB    INVASIVE VASCULAR N/A 7/1/2022    Insert stent peripheral artery performed by Adonis Vela MD at [address] [place] [address] CATH LAB    OTHER SURGICAL HISTORY  Feb, March 2012    ANSELMO    [place] CARDIAC SURG PROCEDURE UNLIST          Family History   Problem Relation Age of Onset    Heart Disease Brother     Hypertension Brother     Diabetes Brother     Hypertension Father     Diabetes Mother     Hypertension Mother     Diabetes Paternal Grandmother     Diabetes Paternal Grandfather         Social History     Socioeconomic History    Marital status:      Spouse name: None    Number of children: None    Years of education: None    Highest education level: None   Tobacco Use    Smoking status: Every Day     Packs/day: 0.50     Types: Cigarettes    Smokeless tobacco: Never   Vaping Use    Vaping Use: Never used   Substance and Sexual Activity    Alcohol use: Not Currently     Alcohol/week: 0.0 standard drinks    Drug use: No         Other     Discharge Medication List as of 10/22/2022  2:42 PM        CONTINUE these medications which have NOT CHANGED    Details rosuvastatin (CRESTOR) 20 MG tablet Take 1 tablet by mouth nightly, Disp-30 tablet, R-3Normal      clopidogrel (PLAVIX) 75 MG tablet Take 1 tablet by mouth daily, Disp-90 tablet, R-3Normal      amLODIPine (NORVASC) 10 MG tablet Take by mouth dailyHistorical Med      aspirin 81 MG EC tablet Take 81 mg by mouth dailyHistorical Med      atorvastatin (LIPITOR) 40 MG tablet Take 40 mg by mouth dailyHistorical Med      carvedilol (COREG) 12.5 MG tablet Take 12.5 mg by mouth 2 times daily (with meals)Historical Med      diclofenac sodium (VOLTAREN) 1 % GEL Apply 4 g topically 4 times daily, Topical, 4 TIMES DAILY Starting Mon 7/27/2020, Historical Med      HYDROcodone-acetaminophen (NORCO) 5-325 MG per tablet Take 1 tablet by mouth every 4 hours as needed. Historical Med              Vitals signs and nursing note reviewed. Patient Vitals for the past 4 hrs:   Temp Pulse Resp BP SpO2   10/22/22 1217 98.2 °F (36.8 °C) 68 19 127/84 97 %          Physical Exam  Vitals and nursing note reviewed. Constitutional:       General: He is not in acute distress. Appearance: Normal appearance. He is normal weight. He is not ill-appearing, toxic-appearing or diaphoretic. HENT:      Head: Normocephalic and atraumatic. No right periorbital erythema or left periorbital erythema. Jaw: There is normal jaw occlusion. Comments: 0.5 cm area at the base of patient's skull that is raised up with approximately 0.5 cm in diameter area of induration around central shanel that has some crusting serous fluid present. No erythema noted    Several discrete raised areas present along left side of distal neck with some or excoriations from scratching hives extend around to partial aspect of right side of neck    Mild swelling underneath right right eyelid     Nose: Nose normal.      Mouth/Throat:      Lips: Pink. Mouth: Mucous membranes are moist. No oral lesions or angioedema. Tongue: No lesions.  Tongue does not deviate from midline. Palate: No mass and lesions. Pharynx: Oropharynx is clear. Uvula midline. No pharyngeal swelling, oropharyngeal exudate, posterior oropharyngeal erythema or uvula swelling. Tonsils: No tonsillar exudate or tonsillar abscesses. 0 on the right. 0 on the left. Eyes:      General: No scleral icterus. Extraocular Movements: Extraocular movements intact. Conjunctiva/sclera: Conjunctivae normal.   Cardiovascular:      Rate and Rhythm: Normal rate. Pulses: Normal pulses. Heart sounds: Normal heart sounds. Pulmonary:      Effort: Pulmonary effort is normal.      Breath sounds: Normal breath sounds. Abdominal:      General: Bowel sounds are normal.      Palpations: Abdomen is soft. Tenderness: There is no abdominal tenderness. Musculoskeletal:         General: Normal range of motion. Cervical back: Normal range of motion. Skin:     General: Skin is warm and dry. Capillary Refill: Capillary refill takes less than 2 seconds. Findings: Rash present. Neurological:      General: No focal deficit present. Mental Status: He is alert and oriented to person, place, and time. Psychiatric:         Mood and Affect: Mood normal.         Behavior: Behavior normal.         Thought Content: Thought content normal.         Judgment: Judgment normal.                  Procedures    No results found for any visits on 10/22/22. No orders to display                       Voice dictation software was used during the making of this note. This software is not perfect and grammatical and other typographical errors may be present. This note has not been completely proofread for errors.       Howard Gutierrez [place]  10/22/22 5612

## 2022-10-22 NOTE — ED TRIAGE NOTES
Patient c/o of bug bite that happened sitting in his brother's chair at the back bottom of his head. Since then patient has been getting a rash around the left side of his neck. Patient took benadryl 50mg last night and woke up with right under eye swelling.  Patient took another 50mg dose of benadryl this AM.

## 2022-10-22 NOTE — ED NOTES
I have reviewed discharge instructions with the patient and spouse. The patient and spouse verbalized understanding. Patient left ED via Discharge Method: ambulatory to Home with spouse. Opportunity for questions and clarification provided. Patient given 2 scripts. To continue your aftercare when you leave the hospital, you may receive an automated call from our care team to check in on how you are doing. This is a free service and part of our promise to provide the best care and service to meet your aftercare needs.  If you have questions, or wish to unsubscribe from this service please call [phone]. Thank you for Choosing our [address] [place] [address] Emergency Department.         Susy Villavicencio RN  10/22/22 8996

## 2022-10-22 NOTE — DISCHARGE INSTRUCTIONS
Your evaluated in the emergency department today for states    Your physical exam is reassuring    You were given steroids, IV Benadryl, IV Pepcid with moderate relief. You were given a dose of hydroxyzine which is an antihistamine here in the emergency department as well by mouth    I have written you prescription for a course of steroids and a prescription for hydroxyzine. You can take hydroxyzine and Benadryl however take caution as it can make you very groggy. Do not drink alcohol on these medications    Recommend cold compresses or ice packs to itchy areas. Do not sleep with ice packs on your skin.     Contact your primary care provider on Monday to schedule follow-up next week    Return to the emergency department if you develop wheezing, swelling of your lips, swelling of your tongue, increased swelling of your face, general worsening of your condition

## 2022-11-14 DIAGNOSIS — I42.8 NICM (NONISCHEMIC CARDIOMYOPATHY) (HCC): ICD-10-CM

## 2022-11-14 DIAGNOSIS — I50.22 SYSTOLIC CHF, CHRONIC (HCC): ICD-10-CM

## 2022-11-14 DIAGNOSIS — Z95.810 AICD (AUTOMATIC CARDIOVERTER/DEFIBRILLATOR) PRESENT: Primary | ICD-10-CM

## 2022-11-23 RX ORDER — ROSUVASTATIN CALCIUM 20 MG/1
TABLET, COATED ORAL
Qty: 90 TABLET | Refills: 3 | Status: SHIPPED | OUTPATIENT
Start: 2022-11-23

## 2022-12-30 ENCOUNTER — HOSPITAL ENCOUNTER (EMERGENCY)
Age: 67
Discharge: HOME | End: 2022-12-30
Attending: EMERGENCY MEDICINE
Payer: MEDICARE

## 2022-12-30 VITALS
TEMPERATURE: 98.1 F | HEART RATE: 88 BPM | BODY MASS INDEX: 25.77 KG/M2 | RESPIRATION RATE: 17 BRPM | WEIGHT: 180 LBS | SYSTOLIC BLOOD PRESSURE: 140 MMHG | HEIGHT: 70 IN | DIASTOLIC BLOOD PRESSURE: 91 MMHG | OXYGEN SATURATION: 97 %

## 2022-12-30 DIAGNOSIS — T78.40XA ALLERGIC REACTION, INITIAL ENCOUNTER: Primary | ICD-10-CM

## 2022-12-30 LAB
ALBUMIN SERPL-MCNC: 4.2 G/DL (ref 3.2–4.6)
ALBUMIN/GLOB SERPL: 1.4 {RATIO} (ref 0.4–1.6)
ALP SERPL-CCNC: 62 U/L (ref 45–117)
ALT SERPL-CCNC: 42 U/L (ref 13–61)
ANION GAP SERPL CALC-SCNC: 14 MMOL/L (ref 2–11)
AST SERPL-CCNC: 62 U/L (ref 15–37)
BASOPHILS # BLD: 0 K/UL (ref 0–0.2)
BASOPHILS NFR BLD: 0 % (ref 0–2)
BILIRUB SERPL-MCNC: 0.8 MG/DL (ref 0.2–1.1)
BUN SERPL-MCNC: 13 MG/DL (ref 7–18)
CALCIUM SERPL-MCNC: 9.4 MG/DL (ref 8.3–10.4)
CHLORIDE SERPL-SCNC: 98 MMOL/L (ref 98–107)
CO2 SERPL-SCNC: 27 MMOL/L (ref 21–32)
CREAT SERPL-MCNC: 1.21 MG/DL (ref 0.8–1.5)
DIFFERENTIAL METHOD BLD: ABNORMAL
EOSINOPHIL # BLD: 0.1 K/UL (ref 0–0.8)
EOSINOPHIL NFR BLD: 2 % (ref 0.5–7.8)
ERYTHROCYTE [DISTWIDTH] IN BLOOD BY AUTOMATED COUNT: 14.2 % (ref 11.9–14.6)
GLOBULIN SER CALC-MCNC: 2.9 G/DL (ref 2.8–4.5)
GLUCOSE SERPL-MCNC: 155 MG/DL (ref 65–100)
HCT VFR BLD AUTO: 45 % (ref 41.1–50.3)
HGB BLD-MCNC: 15.6 G/DL (ref 13.6–17.2)
IMM GRANULOCYTES # BLD AUTO: 0 K/UL (ref 0–0.5)
IMM GRANULOCYTES NFR BLD AUTO: 0 % (ref 0–5)
INR PPP: 1
LYMPHOCYTES # BLD: 1 K/UL (ref 0.5–4.6)
LYMPHOCYTES NFR BLD: 21 % (ref 13–44)
MAGNESIUM SERPL-MCNC: 2.1 MG/DL (ref 1.2–2.6)
MCH RBC QN AUTO: 32.3 PG (ref 26.1–32.9)
MCHC RBC AUTO-ENTMCNC: 34.7 G/DL (ref 31.4–35)
MCV RBC AUTO: 93.2 FL (ref 82–102)
MONOCYTES # BLD: 0.3 K/UL (ref 0.1–1.3)
MONOCYTES NFR BLD: 6 % (ref 4–12)
NEUTS SEG # BLD: 3.5 K/UL (ref 1.7–8.2)
NEUTS SEG NFR BLD: 71 % (ref 43–78)
NRBC # BLD: 0 K/UL (ref 0–0.2)
PLATELET # BLD AUTO: 147 K/UL (ref 150–450)
PMV BLD AUTO: 8.8 FL (ref 9.4–12.3)
POTASSIUM SERPL-SCNC: 4 MMOL/L (ref 3.5–5.1)
PROT SERPL-MCNC: 7.1 G/DL (ref 6.4–8.2)
PROTHROMBIN TIME: 14 SEC (ref 12.6–14.3)
RBC # BLD AUTO: 4.83 M/UL (ref 4.23–5.6)
SODIUM SERPL-SCNC: 139 MMOL/L (ref 133–143)
WBC # BLD AUTO: 4.9 K/UL (ref 4.3–11.1)

## 2022-12-30 PROCEDURE — 99284 EMERGENCY DEPT VISIT MOD MDM: CPT

## 2022-12-30 PROCEDURE — 96374 THER/PROPH/DIAG INJ IV PUSH: CPT

## 2022-12-30 PROCEDURE — 85025 COMPLETE CBC W/AUTO DIFF WBC: CPT

## 2022-12-30 PROCEDURE — 6360000002 HC RX W HCPCS: Performed by: EMERGENCY MEDICINE

## 2022-12-30 PROCEDURE — 83735 ASSAY OF MAGNESIUM: CPT

## 2022-12-30 PROCEDURE — 96375 TX/PRO/DX INJ NEW DRUG ADDON: CPT

## 2022-12-30 PROCEDURE — 96372 THER/PROPH/DIAG INJ SC/IM: CPT

## 2022-12-30 PROCEDURE — 85610 PROTHROMBIN TIME: CPT

## 2022-12-30 PROCEDURE — 80053 COMPREHEN METABOLIC PANEL: CPT

## 2022-12-30 RX ORDER — EPINEPHRINE 1 MG/ML(1)
0.3 AMPUL (ML) INJECTION
Status: COMPLETED | OUTPATIENT
Start: 2022-12-30 | End: 2022-12-30

## 2022-12-30 RX ORDER — DIPHENHYDRAMINE HYDROCHLORIDE 50 MG/ML
25 INJECTION INTRAMUSCULAR; INTRAVENOUS
Status: COMPLETED | OUTPATIENT
Start: 2022-12-30 | End: 2022-12-30

## 2022-12-30 RX ORDER — PREDNISONE 20 MG/1
40 TABLET ORAL DAILY
Qty: 4 TABLET | Refills: 0 | Status: SHIPPED | OUTPATIENT
Start: 2022-12-30 | End: 2023-01-01

## 2022-12-30 RX ORDER — METHYLPREDNISOLONE SODIUM SUCCINATE 125 MG/2ML
125 INJECTION, POWDER, LYOPHILIZED, FOR SOLUTION INTRAMUSCULAR; INTRAVENOUS
Status: COMPLETED | OUTPATIENT
Start: 2022-12-30 | End: 2022-12-30

## 2022-12-30 RX ADMIN — METHYLPREDNISOLONE SODIUM SUCCINATE 125 MG: 125 INJECTION, POWDER, FOR SOLUTION INTRAMUSCULAR; INTRAVENOUS at 08:45

## 2022-12-30 RX ADMIN — DIPHENHYDRAMINE HYDROCHLORIDE 25 MG: 50 INJECTION, SOLUTION INTRAMUSCULAR; INTRAVENOUS at 08:44

## 2022-12-30 RX ADMIN — EPINEPHRINE 0.3 MG: 0.1 INJECTION INTRACARDIAC; INTRAVENOUS at 08:45

## 2022-12-30 ASSESSMENT — ENCOUNTER SYMPTOMS
VOMITING: 0
ABDOMINAL PAIN: 0
NAUSEA: 0
RHINORRHEA: 0
SHORTNESS OF BREATH: 0
COLOR CHANGE: 1
BACK PAIN: 0
EYE DISCHARGE: 0
FACIAL SWELLING: 0
COUGH: 0
EYE REDNESS: 0
DIARRHEA: 0

## 2022-12-30 ASSESSMENT — LIFESTYLE VARIABLES
HOW OFTEN DO YOU HAVE A DRINK CONTAINING ALCOHOL: MONTHLY OR LESS
HOW MANY STANDARD DRINKS CONTAINING ALCOHOL DO YOU HAVE ON A TYPICAL DAY: 1 OR 2

## 2022-12-30 ASSESSMENT — PAIN - FUNCTIONAL ASSESSMENT: PAIN_FUNCTIONAL_ASSESSMENT: NONE - DENIES PAIN

## 2022-12-30 NOTE — ED NOTES
I have reviewed discharge instructions with the patient. The patient verbalized understanding. Patient left ED via Discharge Method: ambulatory to Home with Self. Opportunity for questions and clarification provided. Patient given 1 scripts. To continue your aftercare when you leave the hospital, you may receive an automated call from our care team to check in on how you are doing. This is a free service and part of our promise to provide the best care and service to meet your aftercare needs.  If you have questions, or wish to unsubscribe from this service please call [phone]. Thank you for Choosing our [facility] Emergency Department.        Charisse Yates RN  12/30/22 9275

## 2022-12-30 NOTE — DISCHARGE INSTRUCTIONS
Take 1-2 Benadryl every 6 hours,  Continue the steroids 2 pills a day on Saturday, and then Sunday  Also try taking Claritin daily and the Pepcid daily  Return to the ER if worse in any way, particularly for difficulty breathing, or swallowing

## 2022-12-30 NOTE — ED PROVIDER NOTES
[facility] Emergency Department Provider Note                   PCP:                Donta Loja MD               Age: 78 y.o. Sex: male     No diagnosis found. DISPOSITION         New Prescriptions    No medications on file       Orders Placed This Encounter   Procedures    CBC with Auto Differential    Magnesium    Comprehensive Metabolic Panel    Griffin Lombard is a 78 y.o. male who presents to the Emergency Department with chief complaint of    Chief Complaint   Patient presents with    Rash      Chief complaint : Rash    HISTORY OF PRESENT ILLNESS :  Location : Diffuse    Quality : Pruritic and associated with some ecchymosis    Quantity : Constant    Timing : 2 days ago on Wednesday summer 2020 eighth    Severity : Moderate    Context : No new detergents or exposures no new medicines. Alleviating / exacerbating factors : Patient takes a single baby aspirin a day as his only blood thinner,  No trial of Benadryl etc.    Associated Symptoms : No breathing or swallowing difficulties, she reports some penile rash and itching and swelling which has dissipated today compared to yesterday    -------------------------------    SOCIAL HISTORY : , smokes cigarettes, drinks alcohol        Review of Systems   Constitutional:  Negative for chills and fever. HENT:  Negative for congestion, facial swelling and rhinorrhea. Eyes:  Negative for discharge and redness. Respiratory:  Negative for cough and shortness of breath. Cardiovascular:  Negative for chest pain and palpitations. Gastrointestinal:  Negative for abdominal pain, diarrhea, nausea and vomiting. Genitourinary:  Negative for difficulty urinating, dysuria and frequency. Musculoskeletal:  Negative for arthralgias, back pain and myalgias. Skin:  Positive for color change and rash. Negative for pallor. Neurological:  Negative for dizziness, light-headedness and headaches.    All other systems reviewed and are negative. All other systems reviewed and are negative. Past Medical History:   Diagnosis Date    AICD (automatic cardioverter/defibrillator) present 11/17/2015    Anemia     Arrhythmia Sept. 2009    Syncope with paroxysmal episodes of VT and easily inducible VT on EP study. AICD-implanted cardiac defib. CAD (coronary artery disease) 2010    mi -- no stents    CAD (coronary artery disease)     cath done Sep. 2009 showed moderate, diffuse disease. No high grade areas of stenosis except for an occluded RCA. Chronic combined systolic and diastolic CHF (congestive heart failure) ([address] 11/17/2015    Chronic obstructive pulmonary disease (HCC)     Chronic pain     Coronary atherosclerosis of native coronary vessel 11/17/2015    Depression     Diabetes ([address] 2011    type 2-- sqbs avg am --pt unsure --- does not check sqbs    Dyslipidemia     ED (erectile dysfunction) 11/17/2015    GERD (gastroesophageal reflux disease)     Heart failure ([address]     EF 40-45% on echo March 2010    HLD (hyperlipidemia) 11/17/2015    HTN (hypertension), benign 11/17/2015    Hypertension x 3 yrs    contolled with meds    Morbid obesity ([address]     bmi=31    Paroxysmal atrial fibrillation ([address] 11/17/2015    Paroxysmal VT 11/17/2015    Psychiatric disorder     Syncope     micturition syncope Dec. 2009. Repeat episode syncope 10-23-11.     Syncope and collapse 56/70/8803    Systolic CHF, chronic ([address] 11/17/2015    Tobacco abuse disorder 11/17/2015    Type II diabetes mellitus, uncontrolled 11/17/2015        Past Surgical History:   Procedure Laterality Date    CARDIAC DEFIBRILLATOR PLACEMENT  2009    icd    COLONOSCOPY      INVASIVE VASCULAR N/A 7/1/2022    ANGIOGRAPHY LOWER EXT BILAT performed by Joshua Alfaro MD at [facility] CATH LAB    INVASIVE VASCULAR N/A 7/1/2022    Angioplasty peripheral artery performed by Joshua Alfaro MD at [facility] CATH LAB    INVASIVE VASCULAR N/A 7/1/2022    Insert stent peripheral artery performed by Laura Soria MD at [facility] CARDIAC CATH LAB    OTHER SURGICAL HISTORY  Feb, March 2012    ANSELMO    [place] CARDIAC SURG PROCEDURE UNLIST          Family History   Problem Relation Age of Onset    Heart Disease Brother     Hypertension Brother     Diabetes Brother     Hypertension Father     Diabetes Mother     Hypertension Mother     Diabetes Paternal Grandmother     Diabetes Paternal Grandfather         Social Connections: Not on file        Allergies   Allergen Reactions    Other Other (See Comments)     ants        Vitals signs and nursing note reviewed. Patient Vitals for the past 4 hrs:   Temp Pulse Resp SpO2   12/30/22 0808 98.2 °F (36.8 °C) 91 20 98 %          Physical Exam  Vitals and nursing note reviewed. Constitutional:       General: He is not in acute distress. Appearance: Normal appearance. He is not ill-appearing. HENT:      Head: Normocephalic and atraumatic. Right Ear: External ear normal.      Left Ear: External ear normal.      Nose: Nose normal. No rhinorrhea. Eyes:      General: No scleral icterus. Right eye: No discharge. Left eye: No discharge. Extraocular Movements: Extraocular movements intact. Conjunctiva/sclera: Conjunctivae normal.   Cardiovascular:      Rate and Rhythm: Normal rate. Pulmonary:      Effort: Pulmonary effort is normal. No respiratory distress. Breath sounds: No wheezing or rales. Abdominal:      General: Abdomen is flat. Palpations: Abdomen is soft. Tenderness: There is no abdominal tenderness. There is no guarding or rebound. Genitourinary:     Penis: Normal.       Testes: Normal.   Musculoskeletal:         General: No swelling, tenderness or signs of injury. Normal range of motion. Cervical back: Normal range of motion and neck supple. No rigidity. Skin:     General: Skin is warm and dry. Coloration: Skin is not jaundiced or pale. Findings: Bruising and rash present.    Neurological: General: No focal deficit present. Mental Status: He is alert and oriented to person, place, and time. Gait: Gait normal.   Psychiatric:         Behavior: Behavior normal.        MDM      Procedures    Labs Reviewed   CBC WITH AUTO DIFFERENTIAL   MAGNESIUM   COMPREHENSIVE METABOLIC PANEL   PROTIME-INR        No orders to display            Edy Coma Scale  Eye Opening: Spontaneous  Best Verbal Response: Oriented  Best Motor Response: Obeys commands  Edy Coma Scale Score: 15                     Voice dictation software was used during the making of this note. This software is not perfect and grammatical and other typographical errors may be present. This note has not been completely proofread for errors.        Jolly Beltran MD  12/30/22 9265

## 2022-12-30 NOTE — ED TRIAGE NOTES
Pt presents to the ED for c/o rash, lips swelling and states that his penis is swollen.   Noticed that the rash started on wednesday

## 2023-02-24 ENCOUNTER — OFFICE VISIT (OUTPATIENT)
Dept: CARDIOLOGY CLINIC | Age: 68
End: 2023-02-24
Payer: MEDICARE

## 2023-02-24 VITALS
DIASTOLIC BLOOD PRESSURE: 102 MMHG | SYSTOLIC BLOOD PRESSURE: 160 MMHG | BODY MASS INDEX: 27.2 KG/M2 | HEART RATE: 80 BPM | WEIGHT: 190 LBS | HEIGHT: 70 IN

## 2023-02-24 DIAGNOSIS — I25.10 ATHEROSCLEROSIS OF NATIVE CORONARY ARTERY OF NATIVE HEART WITHOUT ANGINA PECTORIS: ICD-10-CM

## 2023-02-24 DIAGNOSIS — I50.22 SYSTOLIC CHF, CHRONIC (HCC): ICD-10-CM

## 2023-02-24 DIAGNOSIS — I48.0 PAROXYSMAL ATRIAL FIBRILLATION (HCC): Primary | ICD-10-CM

## 2023-02-24 DIAGNOSIS — Z95.810 AICD (AUTOMATIC CARDIOVERTER/DEFIBRILLATOR) PRESENT: ICD-10-CM

## 2023-02-24 DIAGNOSIS — I73.9 PVD (PERIPHERAL VASCULAR DISEASE) (HCC): ICD-10-CM

## 2023-02-24 DIAGNOSIS — I10 HTN (HYPERTENSION), BENIGN: ICD-10-CM

## 2023-02-24 DIAGNOSIS — I42.8 NICM (NONISCHEMIC CARDIOMYOPATHY) (HCC): ICD-10-CM

## 2023-02-24 PROCEDURE — G8428 CUR MEDS NOT DOCUMENT: HCPCS | Performed by: INTERNAL MEDICINE

## 2023-02-24 PROCEDURE — 3077F SYST BP >= 140 MM HG: CPT | Performed by: INTERNAL MEDICINE

## 2023-02-24 PROCEDURE — G8417 CALC BMI ABV UP PARAM F/U: HCPCS | Performed by: INTERNAL MEDICINE

## 2023-02-24 PROCEDURE — 4004F PT TOBACCO SCREEN RCVD TLK: CPT | Performed by: INTERNAL MEDICINE

## 2023-02-24 PROCEDURE — 3017F COLORECTAL CA SCREEN DOC REV: CPT | Performed by: INTERNAL MEDICINE

## 2023-02-24 PROCEDURE — 99214 OFFICE O/P EST MOD 30 MIN: CPT | Performed by: INTERNAL MEDICINE

## 2023-02-24 PROCEDURE — 1123F ACP DISCUSS/DSCN MKR DOCD: CPT | Performed by: INTERNAL MEDICINE

## 2023-02-24 PROCEDURE — 3080F DIAST BP >= 90 MM HG: CPT | Performed by: INTERNAL MEDICINE

## 2023-02-24 PROCEDURE — G8484 FLU IMMUNIZE NO ADMIN: HCPCS | Performed by: INTERNAL MEDICINE

## 2023-02-24 RX ORDER — BENZONATATE 100 MG/1
CAPSULE ORAL
COMMUNITY
Start: 2023-02-19

## 2023-02-24 RX ORDER — AMLODIPINE BESYLATE 10 MG/1
10 TABLET ORAL DAILY
Qty: 90 TABLET | Refills: 3 | Status: SHIPPED | OUTPATIENT
Start: 2023-02-24

## 2023-02-24 NOTE — PROGRESS NOTES
[address], [address] [facility], [address] [place] [address]  PHONE: [phone]     23    NAME:  Justin Wagoner  : 1955  MRN: [id number]       SUBJECTIVE:   Justin Wagoner is a 78 y.o. male seen for a follow up visit regarding the following:     Chief Complaint   Patient presents with    Coronary Artery Disease       HPI: Here for CV, SHF/NICM follow up,   [facility]  with mod CAD, ICD  after syncope and VT   cSHF (EF 25% )   pAF (likes being just on ASA). Echo 3/2019: EF 40-45%   Echo 2022: EF 30%  LE Stent placement: 2022      [facility] Admission 2018: alcohol induced pancreatitis. His stay was complicated with an ileus and delirium from alcohol withdrawal.      Dealing with URI now. Viral panel in ER  at [facility] was ok. In [address] now, doing well now, on angina. No CP. No new COLES. No new angina. He has NYHA Class II sx now. Patient denies recent history of orthopnea, PND, excessive dizziness and/or syncope. Still smoking now. Past Medical History, Past Surgical History, Family history, Social History, and Medications were all reviewed with the patient today and updated as necessary. Current Outpatient Medications   Medication Sig Dispense Refill    benzonatate (TESSALON) 100 MG capsule TAKE 1 CAPSULE (100MG) 3 TIMES A DAY AS NEEDED FOR COUGH FOR UP TO 7 DAYS      amLODIPine (NORVASC) 10 MG tablet Take 1 tablet by mouth daily 90 tablet 3    rosuvastatin (CRESTOR) 20 MG tablet TAKE ONE TABLET BY MOUTH EVERY NIGHT 90 tablet 3    aspirin 81 MG EC tablet Take 81 mg by mouth daily      atorvastatin (LIPITOR) 40 MG tablet Take 40 mg by mouth daily      carvedilol (COREG) 12.5 MG tablet Take 12.5 mg by mouth 2 times daily (with meals)      clopidogrel (PLAVIX) 75 MG tablet Take 1 tablet by mouth daily 90 tablet 3    diclofenac sodium (VOLTAREN) 1 % GEL Apply 4 g topically 4 times daily       No current facility-administered medications for this visit.         Allergies Allergen Reactions    Other Other (See Comments)     ants     Patient Active Problem List    Diagnosis Date Noted    PVD (peripheral vascular disease) ([facility] 06/27/2022     Priority: Medium    Claudication ([address] 05/18/2022    NICM (nonischemic cardiomyopathy) ([facility] 12/27/2017    AICD (automatic cardioverter/defibrillator) present 11/17/2015    HTN (hypertension), benign 11/17/2015    Paroxysmal VT 11/17/2015     Syncope with paroxysmal episodes of VT and easily inducible VT on EP   study. ICD implanted Sept 09. Coronary atherosclerosis of native coronary vessel 11/17/2015     [facility] 2009: moderate CAD        Syncope and collapse 11/17/2015     Micturition syncope Dec 09. Repeat episode of syncope 10/23/11 and again   Feb 2012. Taken to ER, but not admitted. Seen by neurology and given a   seizure med. No syncope since.          ED (erectile dysfunction) 11/17/2015    Type II diabetes mellitus, uncontrolled 11/17/2015    Paroxysmal atrial fibrillation ([facility] 11/17/2015    HLD (hyperlipidemia) 11/17/2015    Tobacco abuse disorder 57/45/6148    Systolic CHF, chronic ([facility] 11/17/2015     Echo 1/15: EF 55%, mild MR  Echo 2009: EF 25%          Past Surgical History:   Procedure Laterality Date    CARDIAC DEFIBRILLATOR PLACEMENT  2009    icd    COLONOSCOPY      INVASIVE VASCULAR N/A 7/1/2022    ANGIOGRAPHY LOWER EXT BILAT performed by Mimi Hanson MD at [address] [place] [address] CATH LAB    INVASIVE VASCULAR N/A 7/1/2022    Angioplasty peripheral artery performed by Mimi Hanson MD at [address] [place] [address] CATH LAB    INVASIVE VASCULAR N/A 7/1/2022    Insert stent peripheral artery performed by Mimi Hanson MD at [address] [place] [address] CATH LAB    OTHER SURGICAL HISTORY  Feb, March 2012    ANSELMO    [place] UNLISTED PROCEDURE CARDIAC SURGERY       Family History   Problem Relation Age of Onset    Heart Disease Brother     Hypertension Brother     Diabetes Brother     Hypertension Father     Diabetes Mother     Hypertension Mother     Diabetes Paternal Grandmother     Diabetes Paternal Grandfather      Social History     Tobacco Use    Smoking status: Every Day     Packs/day: 0.50     Types: Cigarettes    Smokeless tobacco: Never   Substance Use Topics    Alcohol use: Yes     Comment: occassionally         ROS:    No obvious pertinent positives on review of systems except for what was outlined in the HPI today.       PHYSICAL EXAM:     BP (!) 160/102   Pulse 80   Ht 5' 10\" (1.778 m)   Wt 190 lb (86.2 kg)   BMI 27.26 kg/m²    General/Constitutional:   Alert and oriented x 3, no acute distress  HEENT:   normocephalic, atraumatic, moist mucous membranes  Neck:   No JVD or carotid bruits bilaterally  Cardiovascular:   regular rate and rhythm, no murmur/rub/gallop appreciated  Pulmonary:   clear to auscultation bilaterally, no respiratory distress  Abdomen:   soft, non-tender, non-distended  Ext:   No sig LE edema bilaterally  Skin:  warm and dry, no obvious rashes seen  Neuro:   no obvious sensory or motor deficits  Psychiatric:   normal mood and affect      Lab Results   Component Value Date/Time     12/30/2022 08:39 AM    K 4.0 12/30/2022 08:39 AM    CL 98 12/30/2022 08:39 AM    CO2 27 12/30/2022 08:39 AM    BUN 13 12/30/2022 08:39 AM    CREATININE 1.21 12/30/2022 08:39 AM    GLUCOSE 155 12/30/2022 08:39 AM    CALCIUM 9.4 12/30/2022 08:39 AM        Lab Results   Component Value Date    WBC 4.9 12/30/2022    HGB 15.6 12/30/2022    HCT 45.0 12/30/2022    MCV 93.2 12/30/2022     (L) 12/30/2022       Lab Results   Component Value Date    TSH 1.300 07/20/2020       Lab Results   Component Value Date    LABA1C 6.7 (H) 07/20/2020     Lab Results   Component Value Date     07/20/2020       Lab Results   Component Value Date    CHOL 166 07/21/2022    CHOL 156 07/20/2020     Lab Results   Component Value Date    TRIG 141 07/21/2022    TRIG 75 07/20/2020     Lab Results   Component Value Date    HDL 38 (L) 07/21/2022    HDL 40 07/20/2020     Lab Results   Component Value Date    LDLCALC 99.8 07/21/2022    LDLCALC 101 (H) 07/20/2020     Lab Results   Component Value Date    LABVLDL 28.2 (H) 07/21/2022    LABVLDL 15 07/20/2020     Lab Results   Component Value Date    CHOLHDLRATIO 4.4 07/21/2022    CHOLHDLRATIO 3.9 07/20/2020           I have Independently reviewed prior care notes, any ER records available, cardiac testing, labs and results with the patient and before seeing the patient today. Also independently reviewed outside records when available. ASSESSMENT:    Asaf House was seen today for coronary artery disease. Diagnoses and all orders for this visit:    Paroxysmal atrial fibrillation (HCC)    PVD (peripheral vascular disease) ([facility])    Systolic CHF, chronic (HCC)    NICM (nonischemic cardiomyopathy) ([facility]    HTN (hypertension), benign    Atherosclerosis of native coronary artery of native heart without angina pectoris    AICD (automatic cardioverter/defibrillator) present    Other orders  -     amLODIPine (NORVASC) 10 MG tablet; Take 1 tablet by mouth daily        PLAN:      1. CSHF/NICM: EF 30%, in [employer] gym. Have held Ace/ARB in the past with CKD, will not change for now. Remain on coreg. Seems better now. Stopped all alcohol, this has helped more than anything I believe. 2. PAF:  Follow for more pAF, he likes just being on ASA, CVA risk reviewed in the past.  Follow on device checks. 3. CAD:   Remain on ASA, statin and BB. Follow for angina. The patient has been instructed to call with any angina or equivalent as reviewed today. All questions were answered with the patient voicing complete understanding. 4. VT/ICD:  Follow in device clinic. 5. HTN:  Follow for now. Add back norvasc, follow. 6. Tobacco use. Needs to stop smoking, off alcohol now. Reviewed today. Cessation stressed. 7. HPL: remain on lipitor. 8. PVD:  remain on ASA and statin.    Will follow AAA as well.       Patient has been instructed and agrees to call our office with any issues or other concerns related to their cardiac condition(s) and/or complaint(s). Return in about 2 months (around 4/24/2023).        Carl Selby, DO  2/24/2023

## 2023-03-14 ENCOUNTER — OFFICE VISIT (OUTPATIENT)
Dept: INTERNAL MEDICINE CLINIC | Facility: CLINIC | Age: 68
End: 2023-03-14
Payer: MEDICARE

## 2023-03-14 VITALS
HEIGHT: 70 IN | OXYGEN SATURATION: 97 % | DIASTOLIC BLOOD PRESSURE: 86 MMHG | HEART RATE: 77 BPM | TEMPERATURE: 98.2 F | SYSTOLIC BLOOD PRESSURE: 134 MMHG | RESPIRATION RATE: 12 BRPM | BODY MASS INDEX: 27.63 KG/M2 | WEIGHT: 193 LBS

## 2023-03-14 DIAGNOSIS — E11.9 TYPE 2 DIABETES MELLITUS WITHOUT COMPLICATION, WITHOUT LONG-TERM CURRENT USE OF INSULIN (HCC): ICD-10-CM

## 2023-03-14 DIAGNOSIS — I42.8 NICM (NONISCHEMIC CARDIOMYOPATHY) (HCC): ICD-10-CM

## 2023-03-14 DIAGNOSIS — E78.2 MIXED HYPERLIPIDEMIA: ICD-10-CM

## 2023-03-14 DIAGNOSIS — E11.9 TYPE 2 DIABETES MELLITUS WITHOUT COMPLICATION, WITHOUT LONG-TERM CURRENT USE OF INSULIN (HCC): Primary | ICD-10-CM

## 2023-03-14 DIAGNOSIS — I48.0 PAROXYSMAL ATRIAL FIBRILLATION (HCC): ICD-10-CM

## 2023-03-14 DIAGNOSIS — I10 HTN (HYPERTENSION), BENIGN: ICD-10-CM

## 2023-03-14 DIAGNOSIS — I73.9 PVD (PERIPHERAL VASCULAR DISEASE) (HCC): ICD-10-CM

## 2023-03-14 DIAGNOSIS — Z23 ENCOUNTER FOR IMMUNIZATION: ICD-10-CM

## 2023-03-14 DIAGNOSIS — Z87.891 PERSONAL HISTORY OF TOBACCO USE: ICD-10-CM

## 2023-03-14 LAB
EST. AVERAGE GLUCOSE BLD GHB EST-MCNC: 137 MG/DL
HBA1C MFR BLD: 6.4 % (ref 4.8–5.6)

## 2023-03-14 PROCEDURE — G8484 FLU IMMUNIZE NO ADMIN: HCPCS | Performed by: INTERNAL MEDICINE

## 2023-03-14 PROCEDURE — 99214 OFFICE O/P EST MOD 30 MIN: CPT | Performed by: INTERNAL MEDICINE

## 2023-03-14 PROCEDURE — G0009 ADMIN PNEUMOCOCCAL VACCINE: HCPCS | Performed by: INTERNAL MEDICINE

## 2023-03-14 PROCEDURE — 2022F DILAT RTA XM EVC RTNOPTHY: CPT | Performed by: INTERNAL MEDICINE

## 2023-03-14 PROCEDURE — 1123F ACP DISCUSS/DSCN MKR DOCD: CPT | Performed by: INTERNAL MEDICINE

## 2023-03-14 PROCEDURE — 3075F SYST BP GE 130 - 139MM HG: CPT | Performed by: INTERNAL MEDICINE

## 2023-03-14 PROCEDURE — G8427 DOCREV CUR MEDS BY ELIG CLIN: HCPCS | Performed by: INTERNAL MEDICINE

## 2023-03-14 PROCEDURE — 4004F PT TOBACCO SCREEN RCVD TLK: CPT | Performed by: INTERNAL MEDICINE

## 2023-03-14 PROCEDURE — G0296 VISIT TO DETERM LDCT ELIG: HCPCS | Performed by: INTERNAL MEDICINE

## 2023-03-14 PROCEDURE — G8417 CALC BMI ABV UP PARAM F/U: HCPCS | Performed by: INTERNAL MEDICINE

## 2023-03-14 PROCEDURE — 3017F COLORECTAL CA SCREEN DOC REV: CPT | Performed by: INTERNAL MEDICINE

## 2023-03-14 PROCEDURE — 3046F HEMOGLOBIN A1C LEVEL >9.0%: CPT | Performed by: INTERNAL MEDICINE

## 2023-03-14 PROCEDURE — 90677 PCV20 VACCINE IM: CPT | Performed by: INTERNAL MEDICINE

## 2023-03-14 PROCEDURE — 3079F DIAST BP 80-89 MM HG: CPT | Performed by: INTERNAL MEDICINE

## 2023-03-14 SDOH — ECONOMIC STABILITY: HOUSING INSECURITY
IN THE LAST 12 MONTHS, WAS THERE A TIME WHEN YOU DID NOT HAVE A STEADY PLACE TO SLEEP OR SLEPT IN A SHELTER (INCLUDING NOW)?: NO

## 2023-03-14 SDOH — ECONOMIC STABILITY: FOOD INSECURITY: WITHIN THE PAST 12 MONTHS, THE FOOD YOU BOUGHT JUST DIDN'T LAST AND YOU DIDN'T HAVE MONEY TO GET MORE.: NEVER TRUE

## 2023-03-14 SDOH — ECONOMIC STABILITY: FOOD INSECURITY: WITHIN THE PAST 12 MONTHS, YOU WORRIED THAT YOUR FOOD WOULD RUN OUT BEFORE YOU GOT MONEY TO BUY MORE.: NEVER TRUE

## 2023-03-14 SDOH — ECONOMIC STABILITY: INCOME INSECURITY: HOW HARD IS IT FOR YOU TO PAY FOR THE VERY BASICS LIKE FOOD, HOUSING, MEDICAL CARE, AND HEATING?: NOT HARD AT ALL

## 2023-03-14 ASSESSMENT — PATIENT HEALTH QUESTIONNAIRE - PHQ9
SUM OF ALL RESPONSES TO PHQ QUESTIONS 1-9: 0
1. LITTLE INTEREST OR PLEASURE IN DOING THINGS: 0
2. FEELING DOWN, DEPRESSED OR HOPELESS: 0
SUM OF ALL RESPONSES TO PHQ9 QUESTIONS 1 & 2: 0

## 2023-03-14 ASSESSMENT — ANXIETY QUESTIONNAIRES
IF YOU CHECKED OFF ANY PROBLEMS ON THIS QUESTIONNAIRE, HOW DIFFICULT HAVE THESE PROBLEMS MADE IT FOR YOU TO DO YOUR WORK, TAKE CARE OF THINGS AT HOME, OR GET ALONG WITH OTHER PEOPLE: NOT DIFFICULT AT ALL
2. NOT BEING ABLE TO STOP OR CONTROL WORRYING: 0
4. TROUBLE RELAXING: 0
1. FEELING NERVOUS, ANXIOUS, OR ON EDGE: 0
6. BECOMING EASILY ANNOYED OR IRRITABLE: 0
3. WORRYING TOO MUCH ABOUT DIFFERENT THINGS: 0
5. BEING SO RESTLESS THAT IT IS HARD TO SIT STILL: 0
7. FEELING AFRAID AS IF SOMETHING AWFUL MIGHT HAPPEN: 0
GAD7 TOTAL SCORE: 0

## 2023-03-14 NOTE — PATIENT INSTRUCTIONS

## 2023-03-14 NOTE — PROGRESS NOTES
Ana Lilia Donis D.O. [facility]  [address], [address] [id number]  Tel: [phone]    History and Physical Office Visit     Patient Name: Laurence Ordoñez    :  1955   MRN:   [id number]      Today's Date: 23 12:12 PM    Subjective     The patient is a 78y.o. year old male with a pmh as listed below. The patient presents today to establish care. Hypertension  -Amlodipine 10 mg daily    Hyperlipidemia  -Crestor 20 mg daily  -Atorvastatin 40 mg daily  -he does not know why he is on both     Nonischemic cardiomyopathy/systolic congestive heart failure with AICD  -Last EF 30%  -Coreg 12.5 mg 2 times daily  -Follows with cardiology, Dr. Doug Castro, next appointment 2023; last seen 2023    Coronary artery disease  -Following with cardiology  -On aspirin, statin, and beta-blocker    Paroxysmal atrial fibrillation  -Following with cardiology, last seen 2023 he is off anticoagulation and is on aspirin daily  -Coreg 12.5 mg BID    Peripheral vascular disease  -Aspirin 81 mg daily  -Lipitor/Crestor daily    Tobacco use  -he is still smoking a pack about every 3 days  -he quit on his own before, but he started back when his father passed     Type 2 diabetes mellitus  -Not currently on medications; he was offered, Metformin before but denied it   -Last A1c 2020 was 6.7    Health Maintenance:  -He is due for his low-dose CT of his lungs  -He is due for colonoscopy on 2025    New complaints:  NONE today  Review of Systems   All other systems reviewed and are negative. Past Medical History:   Diagnosis Date    AICD (automatic cardioverter/defibrillator) present 2015    Anemia     Arrhythmia 2009    Syncope with paroxysmal episodes of VT and easily inducible VT on EP study. AICD-implanted cardiac defib.     CAD (coronary artery disease)     mi -- no stents    CAD (coronary artery disease)     cath done Sep. 2009 showed moderate, diffuse disease. No high grade areas of stenosis except for an occluded RCA. Chronic combined systolic and diastolic CHF (congestive heart failure) ([facility] 11/17/2015    Chronic obstructive pulmonary disease (HCC)     Chronic pain     Claudication ([facility] 5/18/2022    Coronary atherosclerosis of native coronary vessel 11/17/2015    Depression     Diabetes ([facility] 2011    type 2-- sqbs avg am --pt unsure --- does not check sqbs    Dyslipidemia     ED (erectile dysfunction) 11/17/2015    GERD (gastroesophageal reflux disease)     Heart failure ([facility]     EF 40-45% on echo March 2010    HLD (hyperlipidemia) 11/17/2015    HTN (hypertension), benign 11/17/2015    Hypertension x 3 yrs    contolled with meds    Morbid obesity ([facility]     bmi=31    Paroxysmal atrial fibrillation ([facility] 11/17/2015    Paroxysmal VT 11/17/2015    Psychiatric disorder     Syncope     micturition syncope Dec. 2009. Repeat episode syncope 10-23-11.     Syncope and collapse 74/97/7931    Systolic CHF, chronic ([facility] 11/17/2015    Tobacco abuse disorder 11/17/2015    Type II diabetes mellitus, uncontrolled 11/17/2015     Social History     Socioeconomic History    Marital status:      Spouse name: Not on file    Number of children: Not on file    Years of education: Not on file    Highest education level: Not on file   Occupational History    Not on file   Tobacco Use    Smoking status: Every Day     Packs/day: 0.50     Years: 50.00     Pack years: 25.00     Types: Cigarettes    Smokeless tobacco: Never   Vaping Use    Vaping Use: Never used   Substance and Sexual Activity    Alcohol use: Yes     Comment: occassionally    Drug use: Yes     Types: Marijuana Royce Suarez    Sexual activity: Not on file   Other Topics Concern    Not on file   Social History Narrative    Not on file     Social Determinants of Health     Financial Resource Strain: Low Risk     Difficulty of Paying Living Expenses: Not hard at all   Food Insecurity: No Food Insecurity    Worried About Running Out of Food in the Last Year: Never true    Ran Out of Food in the Last Year: Never true   Transportation Needs: Unknown    Lack of Transportation (Medical): Not on file    Lack of Transportation (Non-Medical): No   Physical Activity: Not on file   Stress: Not on file   Social Connections: Not on file   Intimate Partner Violence: Not on file   Housing Stability: Unknown    Unable to Pay for Housing in the Last Year: Not on file    Number of Places Lived in the Last Year: Not on file    Unstable Housing in the Last Year: No         Current Outpatient Medications   Medication Sig    benzonatate (TESSALON) 100 MG capsule TAKE 1 CAPSULE (100MG) 3 TIMES A DAY AS NEEDED FOR COUGH FOR UP TO 7 DAYS    amLODIPine (NORVASC) 10 MG tablet Take 1 tablet by mouth daily    rosuvastatin (CRESTOR) 20 MG tablet TAKE ONE TABLET BY MOUTH EVERY NIGHT    aspirin 81 MG EC tablet Take 81 mg by mouth daily    carvedilol (COREG) 12.5 MG tablet Take 12.5 mg by mouth 2 times daily (with meals)    diclofenac sodium (VOLTAREN) 1 % GEL Apply 4 g topically 4 times daily     No current facility-administered medications for this visit. Objective     Vitals:    03/14/23 0916   BP: 134/86   Site: Left Upper Arm   Position: Sitting   Cuff Size: Large Adult   Pulse: 77   Resp: 12   Temp: 98.2 °F (36.8 °C)   TempSrc: Temporal   SpO2: 97%   Weight: 193 lb (87.5 kg)   Height: 5' 10\" (1.778 m)      Physical Exam:  Constitutional: Appears well kempt. Alert/oriented x3. In no acute distress. Head: Normocephalic No trauma. No deformity. No bruits. Neck: Supple. ROM normal. No tenderness. No masses. Eyes: PERRLA. Conjunctivae normal. No discharge. Ears: External ears normal. TM normal. No discharge from ears. Nose: Nose normal. Nares patent. Throat: Clear. No exudates. No erythema. Cardiac: Heart with normal rate/rhythm. No murmurs. No gallops. Pulses normal.   Pulmonary: Lungs clear to auscultation bilaterally.  In no respiratory distress. No wheezing. No rales. No rhonci. Gastrointestinal: Bowel sounds present. Abdomen soft and nondistended. Musculoskeletal: Moves all extremities with good ROM. Non-tender. No swelling. No edema. Neurological: No numbness. No tingling. Alert and oriented. At baseline. No confusion. Patellar Reflexes 2+. Psychiatric: Normal thought content. Normal behavior. Normal judgment.      Diabetic foot exam:     Left Foot:   Visual Exam: normal    Pulse PT: 2+ (normal)   Filament test: normal sensation          Right Foot:   Visual Exam: normal    Pulse PT: 2+ (normal)   Filament test: normal sensation     Recommendations     Assessment:  Patient Active Problem List   Diagnosis    AICD (automatic cardioverter/defibrillator) present    HTN (hypertension), benign    Paroxysmal VT    Coronary atherosclerosis of native coronary vessel    NICM (nonischemic cardiomyopathy) ([facility])    ED (erectile dysfunction)    Type II diabetes mellitus, uncontrolled    Paroxysmal atrial fibrillation (HCC)    HLD (hyperlipidemia)    Tobacco abuse disorder    Systolic CHF, chronic (HCC)    PVD (peripheral vascular disease) ([facility]      Status of Medical Conditions: stable     Plan:  Hypertension  -Continue amlodipine 10 mg daily    Hyperlipidemia  -Recommend Crestor 20 mg daily, he may stop atorvastatin 40 mg daily for now    Nonischemic cardiomyopathy/systolic congestive heart failure with AICD  -Last EF 30%  -Coreg 12.5 mg 2 times daily  -Follows with cardiology, Dr. Kaitlin Hogue, next appointment 4/24/2023    Coronary artery disease  -Follow-up with cardiology  -Continue on aspirin, statin, and beta-blocker    Paroxysmal atrial fibrillation  -Follow-up with cardiology  -Coreg 12.5 mg BID  -Continue on aspirin 81 mg daily    Peripheral vascular disease  -Continue aspirin 81 mg daily  -Crestor 20 mg daily    Tobacco use  -he is still smoking a pack about every 3 days  -he quit on his own before, but he started back when his father passed   -We will reach out to Dr. Christiano Casas to inquire about Chantix for the patient    Type 2 diabetes mellitus  -Check A1c today  -If A1c is still elevated, restart metformin and probably Jardiance as well as this may benefit his heart and his diabetes    Health Maintenance:  -Low-dose CT scan of his lungs ordered  -He is due for colonoscopy on 2/13/2025    -Previous medical records and/or labs/tests available to me reviewed, any records outstanding not available requested  -The risks, benefits, and medical necessity of all medications, tests labs, and any other orders that were ordered at today's visit were discussed with the patient including all elective or patient requested orders. Decision to order or not order tests or based on this risk/benefit ratio and medical necessity.  -The patient expresses understanding of the plan as I've explained it to him and is in agreement with the current plan. Follow up: Total Time: 45 minutes (chart review and in-exam time)    Signed: Huan Heredia D.O.  03/14/23  12:12 PMDiscussed with the patient the current USPSTF guidelines released March 9, 2021 for screening for lung cancer. For adults aged 48 to [de-identified] years who have a 20 pack-year smoking history and currently smoke or have quit within the past 15 years the grade B recommendation is to:  Screen for lung cancer with low-dose computed tomography (LDCT) every year. Stop screening once a person has not smoked for 15 years or has a health problem that limits life expectancy or the ability to have lung surgery. The patient  reports that he has been smoking cigarettes. He has a 25.00 pack-year smoking history. He has never used smokeless tobacco.. Discussed with patient the risks and benefits of screening, including over-diagnosis, false positive rate, and total radiation exposure. The patient currently exhibits no signs or symptoms suggestive of lung cancer.   Discussed with patient the importance of compliance with yearly annual lung cancer screenings and willingness to undergo diagnosis and treatment if screening scan is positive. In addition, the patient was counseled regarding the importance of remaining smoke free and/or total smoking cessation.     Also reviewed the following if the patient has Medicare that as of February 10, 2022, Medicare only covers LDCT screening in patients aged 51-72 with at least a 20 pack-year smoking history who currently smoke or have quit in the last 15 years

## 2023-03-15 LAB
ANION GAP SERPL CALC-SCNC: 4 MMOL/L (ref 2–11)
BUN SERPL-MCNC: 16 MG/DL (ref 8–23)
CALCIUM SERPL-MCNC: 9.6 MG/DL (ref 8.3–10.4)
CHLORIDE SERPL-SCNC: 108 MMOL/L (ref 101–110)
CO2 SERPL-SCNC: 28 MMOL/L (ref 21–32)
CREAT SERPL-MCNC: 1 MG/DL (ref 0.8–1.5)
GLUCOSE SERPL-MCNC: 147 MG/DL (ref 65–100)
POTASSIUM SERPL-SCNC: 4.2 MMOL/L (ref 3.5–5.1)
SODIUM SERPL-SCNC: 140 MMOL/L (ref 133–143)

## 2023-03-16 DIAGNOSIS — Z72.0 TOBACCO ABUSE DISORDER: Primary | ICD-10-CM

## 2023-03-16 RX ORDER — VARENICLINE TARTRATE 0.5 MG/1
.5-1 TABLET, FILM COATED ORAL SEE ADMIN INSTRUCTIONS
Qty: 57 TABLET | Refills: 0 | Status: SHIPPED | OUTPATIENT
Start: 2023-03-16

## 2023-03-16 NOTE — PROGRESS NOTES
Spoke with Dr. April Good, patient's cardiologist via message. Patient is cleared to start varenicline For smoking cessation. We will order for patient. Dwight Conway D.O.   Internal Medicine   [facility]

## 2023-03-24 ENCOUNTER — OFFICE VISIT (OUTPATIENT)
Dept: INTERNAL MEDICINE CLINIC | Facility: CLINIC | Age: 68
End: 2023-03-24
Payer: MEDICARE

## 2023-03-24 VITALS
WEIGHT: 194 LBS | DIASTOLIC BLOOD PRESSURE: 86 MMHG | OXYGEN SATURATION: 96 % | BODY MASS INDEX: 27.77 KG/M2 | RESPIRATION RATE: 14 BRPM | SYSTOLIC BLOOD PRESSURE: 137 MMHG | HEART RATE: 81 BPM | HEIGHT: 70 IN | TEMPERATURE: 98.3 F

## 2023-03-24 DIAGNOSIS — I42.8 NICM (NONISCHEMIC CARDIOMYOPATHY) (HCC): ICD-10-CM

## 2023-03-24 DIAGNOSIS — I48.0 PAROXYSMAL ATRIAL FIBRILLATION (HCC): ICD-10-CM

## 2023-03-24 DIAGNOSIS — I50.22 SYSTOLIC CHF, CHRONIC (HCC): ICD-10-CM

## 2023-03-24 DIAGNOSIS — R48.1 LOSS OF PERCEPTION FOR TASTE: ICD-10-CM

## 2023-03-24 DIAGNOSIS — E78.2 MIXED HYPERLIPIDEMIA: ICD-10-CM

## 2023-03-24 DIAGNOSIS — I25.10 ATHEROSCLEROSIS OF NATIVE CORONARY ARTERY OF NATIVE HEART WITHOUT ANGINA PECTORIS: ICD-10-CM

## 2023-03-24 DIAGNOSIS — Z72.0 TOBACCO ABUSE DISORDER: ICD-10-CM

## 2023-03-24 DIAGNOSIS — R43.0 LOSS OF SMELL: ICD-10-CM

## 2023-03-24 DIAGNOSIS — I10 HTN (HYPERTENSION), BENIGN: ICD-10-CM

## 2023-03-24 DIAGNOSIS — E11.59 TYPE 2 DIABETES MELLITUS WITH OTHER CIRCULATORY COMPLICATION, WITHOUT LONG-TERM CURRENT USE OF INSULIN (HCC): Primary | ICD-10-CM

## 2023-03-24 PROBLEM — E11.9 DIABETES MELLITUS (HCC): Status: ACTIVE | Noted: 2023-03-24

## 2023-03-24 PROCEDURE — 3075F SYST BP GE 130 - 139MM HG: CPT | Performed by: INTERNAL MEDICINE

## 2023-03-24 PROCEDURE — 3017F COLORECTAL CA SCREEN DOC REV: CPT | Performed by: INTERNAL MEDICINE

## 2023-03-24 PROCEDURE — G8484 FLU IMMUNIZE NO ADMIN: HCPCS | Performed by: INTERNAL MEDICINE

## 2023-03-24 PROCEDURE — G8417 CALC BMI ABV UP PARAM F/U: HCPCS | Performed by: INTERNAL MEDICINE

## 2023-03-24 PROCEDURE — 4004F PT TOBACCO SCREEN RCVD TLK: CPT | Performed by: INTERNAL MEDICINE

## 2023-03-24 PROCEDURE — 3079F DIAST BP 80-89 MM HG: CPT | Performed by: INTERNAL MEDICINE

## 2023-03-24 PROCEDURE — G8428 CUR MEDS NOT DOCUMENT: HCPCS | Performed by: INTERNAL MEDICINE

## 2023-03-24 PROCEDURE — 99214 OFFICE O/P EST MOD 30 MIN: CPT | Performed by: INTERNAL MEDICINE

## 2023-03-24 PROCEDURE — 1123F ACP DISCUSS/DSCN MKR DOCD: CPT | Performed by: INTERNAL MEDICINE

## 2023-03-24 RX ORDER — NICOTINE 21 MG/24HR
1 PATCH, TRANSDERMAL 24 HOURS TRANSDERMAL DAILY
Qty: 42 PATCH | Refills: 0 | Status: SHIPPED | OUTPATIENT
Start: 2023-03-24 | End: 2023-05-05

## 2023-03-24 SDOH — ECONOMIC STABILITY: FOOD INSECURITY: WITHIN THE PAST 12 MONTHS, THE FOOD YOU BOUGHT JUST DIDN'T LAST AND YOU DIDN'T HAVE MONEY TO GET MORE.: NEVER TRUE

## 2023-03-24 SDOH — ECONOMIC STABILITY: FOOD INSECURITY: WITHIN THE PAST 12 MONTHS, YOU WORRIED THAT YOUR FOOD WOULD RUN OUT BEFORE YOU GOT MONEY TO BUY MORE.: NEVER TRUE

## 2023-03-24 SDOH — ECONOMIC STABILITY: INCOME INSECURITY: HOW HARD IS IT FOR YOU TO PAY FOR THE VERY BASICS LIKE FOOD, HOUSING, MEDICAL CARE, AND HEATING?: NOT HARD AT ALL

## 2023-03-24 ASSESSMENT — PATIENT HEALTH QUESTIONNAIRE - PHQ9
SUM OF ALL RESPONSES TO PHQ9 QUESTIONS 1 & 2: 0
1. LITTLE INTEREST OR PLEASURE IN DOING THINGS: 0
SUM OF ALL RESPONSES TO PHQ QUESTIONS 1-9: 0
2. FEELING DOWN, DEPRESSED OR HOPELESS: 0
SUM OF ALL RESPONSES TO PHQ QUESTIONS 1-9: 0

## 2023-03-24 ASSESSMENT — ANXIETY QUESTIONNAIRES
5. BEING SO RESTLESS THAT IT IS HARD TO SIT STILL: 0
4. TROUBLE RELAXING: 0
IF YOU CHECKED OFF ANY PROBLEMS ON THIS QUESTIONNAIRE, HOW DIFFICULT HAVE THESE PROBLEMS MADE IT FOR YOU TO DO YOUR WORK, TAKE CARE OF THINGS AT HOME, OR GET ALONG WITH OTHER PEOPLE: NOT DIFFICULT AT ALL
GAD7 TOTAL SCORE: 0
6. BECOMING EASILY ANNOYED OR IRRITABLE: 0
1. FEELING NERVOUS, ANXIOUS, OR ON EDGE: 0
7. FEELING AFRAID AS IF SOMETHING AWFUL MIGHT HAPPEN: 0
3. WORRYING TOO MUCH ABOUT DIFFERENT THINGS: 0
2. NOT BEING ABLE TO STOP OR CONTROL WORRYING: 0

## 2023-03-24 NOTE — PROGRESS NOTES
diabetes mellitus  -A1c 3/14/2023 was 6.4  -Continue eating healthy and exercise as tolerated  -Patient to discuss Jardiance with his cardiologist next month    Chronic loss of taste and smell  -Referral to [facility]NT for second opinion  -Advised patient that nothing may come to fruition from this appointment as well, similar to his previous ENT appointment at [facility]. We may have to refer him out to a tertiary center to have this evaluated    Health Maintenance:  -Low-dose CT scan of his lungs scheduled for 3/31/2023  -Annual wellness visit scheduled for 4/18/2023  -He is due for colonoscopy on 2/13/2025    -Previous medical records and/or labs/tests available to me reviewed, any records outstanding not available requested  -The risks, benefits, and medical necessity of all medications, tests labs, and any other orders that were ordered at today's visit were discussed with the patient including all elective or patient requested orders. Decision to order or not order tests or based on this risk/benefit ratio and medical necessity.  -The patient expresses understanding of the plan as I've explained it to him and is in agreement with the current plan.     Follow Up: 6 weeks    Total Time: 30 minutes (chart reviewing and in-exam time)    Signed: Lino Ya D.O.  03/24/23  8:46 AM

## 2023-03-28 ENCOUNTER — HOSPITAL ENCOUNTER (OUTPATIENT)
Dept: CT IMAGING | Age: 68
Discharge: HOME | End: 2023-03-31
Payer: MEDICARE

## 2023-03-28 DIAGNOSIS — Z87.891 PERSONAL HISTORY OF TOBACCO USE: ICD-10-CM

## 2023-03-28 PROCEDURE — 71271 CT THORAX LUNG CANCER SCR C-: CPT

## 2023-03-31 NOTE — PROGRESS NOTES
[facility] Internal Medicine Progress Note      Subjective  Has not ambulated yet  Feeling better than yesterday  Denies any cough, chest pain  Shortness of breath decreased      Review of Systems    Negative for all 10 systems except as per subjective    I/O's    Intake/Output Summary (Last 24 hours) at 3/31/2023 0927  Last data filed at 3/31/2023 0914  Gross per 24 hour   Intake --   Output 3025 ml   Net -3025 ml       Last Stool Occurrence:            CIWA   Total Score:       ALLERGIES:  Patient has no known allergies.     Hospital Meds  Current Facility-Administered Medications   Medication   • bumetanide (BUMEX) tablet 1 mg   • sodium chloride 0.9 % flush bag 25 mL   • sodium chloride (PF) 0.9 % injection 2 mL   • acetaminophen (TYLENOL) tablet 650 mg   • docusate sodium-sennosides (SENOKOT S) 50-8.6 MG 2 tablet   • aluminum-magnesium hydroxide-simethicone (MAALOX) 200-200-20 MG/5ML suspension 30 mL   • sodium chloride 0.9 % flush bag 25 mL   • aspirin (ECOTRIN) enteric coated tablet 81 mg   • [Held by provider] spironolactone (ALDACTONE) tablet 12.5 mg   • losartan (COZAAR) tablet 25 mg   • metoPROLOL succinate (TOPROL-XL) ER tablet 25 mg   • enoxaparin (LOVENOX) injection 30 mg       Last Recorded Vitals      Vital Last Value 24 Hour Range   Temperature 97.3 °F (36.3 °C) (03/31/23 0849) Temp  Min: 97.2 °F (36.2 °C)  Max: 98.2 °F (36.8 °C)   Pulse 61 (03/31/23 0849) Pulse  Min: 61  Max: 94   Respiratory 16 (03/31/23 0849) Resp  Min: 16  Max: 18   Non-Invasive  Blood Pressure 108/75 (03/31/23 0849) BP  Min: 102/71  Max: 142/91   Pulse Oximetry 95 % (03/31/23 0849) SpO2  Min: 93 %  Max: 99 %   Arterial   Blood Pressure   No data recorded        Physical Exam  General appearance normal, awake, appropriate for age.  Looks chronically ill.  No acute distress  HEENT normal  Respiratory bilateral basilar crackles decreased compared to yesterday, no wheezing or rhonchi  Cardiac S1 and S2 present.  No S3, no S4.   Patient pre-assessment complete for ICD generator change with Dr Juan Willis scheduled for 17 at 12, arrival time 10am. Patient verified using . Patient instructed to bring all home medications in labeled bottles on the day of procedure. NPO status reinforced. Patient instructed to HOLD losartan/hctz. Instructed they can take all other medications excluding vitamins & supplements. Patient verbalizes understanding of all instructions & denies any questions at this time. No murmur  Abdomen soft, no distention, no tenderness.  Bowel sounds present  Extremity no pitting edema either lower extremity, no calf tenderness either lower extremity  Back no CVA tenderness, no spinal tenderness  Skin intact  CNS alert, awake, oriented x3.  moving all 4 extremities.  Speech intact.  No focal neuro finding.  More alert today and participating in conversation better than yesterday  Psych cooperative    Labs   CBC  Recent Labs   Lab 03/30/23  0444 03/29/23  1739 12/30/22  0609 12/29/22  0611   WBC 7.6 9.0 6.7 6.9   RBC 4.96 5.06 4.94 5.00   HGB 17.0 17.1* 17.5* 17.2*   HCT 50.6 52.7* 50.2 50.6   .0* 104.2* 101.6* 101.2*   MCH 34.3* 33.8 35.4* 34.4*   MCHC 33.6 32.4 34.9 34.0   RDW-CV 18.0* 18.2* 16.4* 16.2*    155 174 156   Lymphocytes, Percent 16  --  18 16     CMP  Recent Labs   Lab 03/31/23  0449 03/30/23  0445 03/29/23  1943 12/30/22  0609   Sodium 130* 131* 132* 135   Chloride 98 104 103 99   BUN 45* 40* 39* 43*   Potassium 3.9 5.1 5.2* 3.8   Glucose 81 104* 117* 105*   Creatinine 1.40* 1.38* 1.53* 1.39*   Calcium 10.4* 10.8* 10.9* 8.9         Blood Culture:  No results found for this or any previous visit.  Urine Culture:  No results found for this or any previous visit.    Imaging  XR CHEST PA AND LATERAL 2 VIEWS  Narrative: EXAM: XR CHEST PA AND LATERAL 2 VIEWS    CLINICAL INDICATION: Shortness of breath, coronary artery disease    COMPARISON: 12/27/2022     TECHNIQUE: Frontal and lateral views of the chest were performed.    FINDINGS: Heart size is normal.  There has been CABG.  ICD leads are  unchanged in position.      There is right lower lobe consolidation and right pleural effusion.  The  right pleural effusion is decreased in size compared to prior exam.  There  is minimal left pleural fluid and atelectasis that is unchanged.  There is  mild pulmonary vascular congestion.  There is no pneumothorax.  Impression:  Right lower lobe airspace consolidation and right pleural  effusion that is  decreased in size compared to chest x-ray exam dated 12/27/2022.  There is  pulmonary vascular congestion.    Electronically Signed by: EVON FARRELL M.D.   Signed on: 3/29/2023 6:36 PM   Workstation ID: [id number]       LAST MRI:  No results found for this or any previous visit.    LAST CT:  === 08/30/19 ===    CT AD[facility]ATE PROCEDURE    - Narrative -  Accession #    [id number]    EXAM: CT ABDOMEN AND PELVIS WO CON    CLINICAL INDICATION: Fever.  STEMI.  Cardiac arrest.  Abnormal gallbladder ultrasound.    TECHNIQUE:  Helical CT of the abdomen and pelvis was performed without intravenous contrast.  Adjustment of the mA and/or kV according to the patient's size was performed.  Per technologist,  patient left arm could not be raised due to recent pacemaker insertion.    COMPARISON: Hepatobiliary scan on 09/11/2019.  Gallbladder ultrasound on 09/10/2019.    FINDINGS:    Lower thorax: The heart is within normal limits in size.  There are partially-imaged cardiac  device leads in the right atrium and right ventricle.  There is coronary atherosclerosis.  There is no pericardial effusion.  There is a calcified subcarinal lymph node, likely due to  old granulomatous disease.  There is a small low-density right pleural effusion.  There is  a trace left pleural effusion.  There is partially-imaged smooth pleural thickening in the  anteroinferior right chest.  There is mild bilateral bronchial wall thickening.  There is  mild dependent bibasilar atelectasis and/or fibrosis.  Minor interstitial edema is not fully  excluded.    Liver: The liver is normal in size.  There is slightly nodular left hepatic surface contour, a  nonspecific finding.  No intrahepatic mass is seen on this non-contrast exam.    Biliary tree: The gallbladder is mildly distended.  Evaluation is limited without contrast.  There is no significant pericholecystic inflammation although subtle cholecystitis is not  excluded.  There is is  no biliary ductal dilatation.    Spleen: The spleen is normal in size.  There is adjacent accessory splenic tissue.    Pancreas:  The pancreas is normal in size.  There are no pancreatic calcifications.  The  pancreatic duct is not dilated.    Adrenal glands: There is a 1.1 cm right adrenal nodule.  The left adrenal gland is within normal  limits in size and shape.    Kidneys: There is an incompletely characterized 4.2 cm right renal cyst.  There is a tiny  right renal hypodensity, too small to characterize.  There is a questionable tiny stone  artifact in the proximal right ureter (2/44).  There is no definite urolithiasis.  There is no  hydronephrosis.  There is mild right pelviectasis.    Lymph nodes: There is no abdominal or pelvic lymph node enlargement.    Vasculature: There is no abdominal aortic aneurysm.  Atherosclerotic calcification is seen.    Peritoneum/mesentery/omentum: There is minimal/mild generalized retroperitoneal fat stranding  including in the right abdomen and about the proximal right ureter.  There is no free fluid or  free air.    GI tract:  There is no bowel obstruction.  The appendix is normal.  There is mild colonic  diverticulosis without definite evidence of acute diverticulitis.    Pelvic urogenital structures: The urinary bladder is poorly distended and not well evaluated.  The prostate gland is present.    Body wall: Mild diffuse osteopenia which hinders sensitivity for subtle osseous abnormality.  Spondylosis.  Old bilateral rib deformities.  Nonunion of imaged sternotomy defect with intact  imaged sternotomy wires.  Bilateral inguinal surgical clips and right inguinal scarring.  A tiny  right inguinal hernia is difficult to exclude.  Minor gynecomastia.      Accession #    [id number]    There is mild anterior pelvic superficial subcutaneous stranding just to the left of midline;  correlation with physical exam is recommended.    Limitation(s): Evaluation of the solid parenchymal  organs and vasculature is limited due to lack  of intravenous contrast.  Beam-hardening artifact from overlying right arm partially limits  evaluation.  Detailed evaluation is limited by motion artifact.    Key: (S/I) = series number / image number    - Impression -  1.  Minimal/mild generalized retroperitoneal edema.  No drainable fluid collection.  2.  Questionable punctate proximal right ureteral stone versus artifact.  Mild right  pelvocaliectasis without hydronephrosis.  Correlation with urinalysis is recommended.  3.  Slightly distended gallbladder, better characterized on comparison exams.  4.  Trace/small bilateral pleural effusions.  Mild right pleural thickening, likely chronic.  CT  chest in one year is recommended.  5.  Right adrenal nodule.  CT of the adrenal glands in one year is recommended.  6.  Additional and incidental findings are described in the report.    -----  F I N A L  -----    Transcribed By: GUADALUPE  09/11/19 10:28 am    Dictated By:            CHANTELLE BALDERRAMA MD    Electronically Reviewed and Approved By:           CHANTELLE BALDERRAMA MD  09/11/19 10:51 am    LAST EKG:  Encounter Date: 03/29/23   Electrocardiogram 12-Lead   Result Value    Ventricular Rate EKG/Min (BPM) 105    Atrial Rate (BPM) 103    KS-Interval (MSEC) 158    QRS-Interval (MSEC) 102    QT-Interval (MSEC) 412    QTc 545    R Axis (Degrees) -66    T Axis (Degrees) 136    REPORT TEXT      Atrial fibrillation  Left anterior fascicular block  Repol abnrm suggests ischemia, anterolateral  Prolonged QT interval  Confirmed by JAY RAY DO ([id number]) on 3/29/2023 7:01:00 PM                 Cultures  Microbiology Results     None             Assessment/Plan  All listed problem present on admission    Acute respiratory failure with hypoxia (CMD)    Acute on chronicsystolic congestive heart failure (CMD)    Congestive heart failure due to cardiomyopathy (CMD)    Hypertensive heart disease, malignant, with acute intensive management,  with heart failure (CMD)    Systolic congestive heart failure (CMD)    Unspecified diastolic (congestive) heart failure (CMD)    Ischemic dilated cardiomyopathy (CMD)    Coronary artery disease involving native coronary artery of native heart without angina pectoris    Chronic atrial fibrillation (CMD)    Noncompliance with medication regimen    Chronic kidney disease (CKD), stage III (moderate) (CMD)    Dilated cardiomyopathy (CMD)    ACP (advance care planning)    Hyponatremia    HLD (hyperlipidemia)     Plan,  Continue telemetry monitoring  Appreciated cardiology consulted, recommendation reviewed  Strict I's and O's  Continue aspirin  Avoid nephrotoxic agent monitor BMP closely while diuresing  Continue IV diuretic.  Monitor creatinine closely while being diuresed.   GDMT with osartan, metoprolol. Spironolactone held due to borderline potassium and cr elavation.  Reviewed echocardiogram December 28, 2022 with ejection fraction 17%.  Severely reduced systolic function.  Lovenox for DVT prophylaxis  Sodium 130 today, creatinine 1.40  Bumex changed to oral today  All available labs personally reviewed, plan of care discussed with patient, care manager, RN and social service  Based on  Medical necessity admission changed to inpatient  Discussed with RN to ambulate patient in hallway making sure he does not desaturate and does not become symptomatic.  Anticipated discharge home tomorrow if continues to improve.    Therapy Orders:  PT and OT Orders Placed this Encounter   Procedures   • Physical Therapy       IP CONSULT TO CARDIOLOGY  IP CONSULT TO HEART FAILURE CLINIC  IP CONSULT TO CARDIOLOGY  IP CONSULT TO CARDIAC REHAB    Primary Care Physician  Landon Cavazos MD notified        Sherry Bhatt MD [facility]  3/31/2023 9:27 AM

## 2023-04-18 ENCOUNTER — OFFICE VISIT (OUTPATIENT)
Dept: INTERNAL MEDICINE CLINIC | Facility: CLINIC | Age: 68
End: 2023-04-18

## 2023-04-18 VITALS
RESPIRATION RATE: 12 BRPM | HEIGHT: 70 IN | TEMPERATURE: 98.2 F | BODY MASS INDEX: 27.92 KG/M2 | OXYGEN SATURATION: 97 % | HEART RATE: 73 BPM | SYSTOLIC BLOOD PRESSURE: 136 MMHG | DIASTOLIC BLOOD PRESSURE: 85 MMHG | WEIGHT: 195 LBS

## 2023-04-18 DIAGNOSIS — Z11.59 NEED FOR HEPATITIS C SCREENING TEST: ICD-10-CM

## 2023-04-18 DIAGNOSIS — E11.59 TYPE 2 DIABETES MELLITUS WITH OTHER CIRCULATORY COMPLICATION, WITHOUT LONG-TERM CURRENT USE OF INSULIN (HCC): ICD-10-CM

## 2023-04-18 DIAGNOSIS — Z72.89 OTHER PROBLEMS RELATED TO LIFESTYLE: ICD-10-CM

## 2023-04-18 DIAGNOSIS — Z13.6 SCREENING FOR AAA (ABDOMINAL AORTIC ANEURYSM): ICD-10-CM

## 2023-04-18 DIAGNOSIS — Z00.00 WELCOME TO MEDICARE PREVENTIVE VISIT: Primary | ICD-10-CM

## 2023-04-18 SDOH — ECONOMIC STABILITY: FOOD INSECURITY: WITHIN THE PAST 12 MONTHS, YOU WORRIED THAT YOUR FOOD WOULD RUN OUT BEFORE YOU GOT MONEY TO BUY MORE.: NEVER TRUE

## 2023-04-18 SDOH — ECONOMIC STABILITY: FOOD INSECURITY: WITHIN THE PAST 12 MONTHS, THE FOOD YOU BOUGHT JUST DIDN'T LAST AND YOU DIDN'T HAVE MONEY TO GET MORE.: NEVER TRUE

## 2023-04-18 SDOH — ECONOMIC STABILITY: INCOME INSECURITY: HOW HARD IS IT FOR YOU TO PAY FOR THE VERY BASICS LIKE FOOD, HOUSING, MEDICAL CARE, AND HEATING?: NOT HARD AT ALL

## 2023-04-18 ASSESSMENT — LIFESTYLE VARIABLES
HOW OFTEN DO YOU HAVE A DRINK CONTAINING ALCOHOL: NEVER
HOW MANY STANDARD DRINKS CONTAINING ALCOHOL DO YOU HAVE ON A TYPICAL DAY: PATIENT DOES NOT DRINK

## 2023-04-18 ASSESSMENT — PATIENT HEALTH QUESTIONNAIRE - PHQ9
2. FEELING DOWN, DEPRESSED OR HOPELESS: 0
SUM OF ALL RESPONSES TO PHQ QUESTIONS 1-9: 0
SUM OF ALL RESPONSES TO PHQ QUESTIONS 1-9: 0
SUM OF ALL RESPONSES TO PHQ9 QUESTIONS 1 & 2: 0
SUM OF ALL RESPONSES TO PHQ QUESTIONS 1-9: 0
1. LITTLE INTEREST OR PLEASURE IN DOING THINGS: 0
SUM OF ALL RESPONSES TO PHQ QUESTIONS 1-9: 0

## 2023-04-18 ASSESSMENT — ANXIETY QUESTIONNAIRES
3. WORRYING TOO MUCH ABOUT DIFFERENT THINGS: 0
6. BECOMING EASILY ANNOYED OR IRRITABLE: 0
GAD7 TOTAL SCORE: 0
2. NOT BEING ABLE TO STOP OR CONTROL WORRYING: 0
5. BEING SO RESTLESS THAT IT IS HARD TO SIT STILL: 0
4. TROUBLE RELAXING: 0
1. FEELING NERVOUS, ANXIOUS, OR ON EDGE: 0
7. FEELING AFRAID AS IF SOMETHING AWFUL MIGHT HAPPEN: 0
IF YOU CHECKED OFF ANY PROBLEMS ON THIS QUESTIONNAIRE, HOW DIFFICULT HAVE THESE PROBLEMS MADE IT FOR YOU TO DO YOUR WORK, TAKE CARE OF THINGS AT HOME, OR GET ALONG WITH OTHER PEOPLE: NOT DIFFICULT AT ALL

## 2023-04-18 NOTE — PROGRESS NOTES
Patient Care Team:  Suresh Walls DO as PCP - General (Internal Medicine)  Suresh Walls DO as PCP - Empaneled Provider     Reviewed and updated this visit:  Tobacco  Allergies  Meds  Med Hx  Surg Hx  Soc Hx  Fam Hx             Suresh Walls DO

## 2023-04-18 NOTE — PATIENT INSTRUCTIONS
of 30 or greater. Reapply every 2 to 3 hours or after sweating, drying off with a towel, or swimming. Always wear a seat belt when traveling in a car. Always wear a helmet when riding a bicycle or motorcycle.

## 2023-04-21 ENCOUNTER — HOSPITAL ENCOUNTER (OUTPATIENT)
Dept: ULTRASOUND IMAGING | Age: 68
Discharge: HOME | End: 2023-04-21
Payer: MEDICARE

## 2023-04-21 DIAGNOSIS — Z13.6 SCREENING FOR AAA (ABDOMINAL AORTIC ANEURYSM): ICD-10-CM

## 2023-04-21 DIAGNOSIS — I77.811 ABDOMINAL AORTIC ECTASIA (HCC): Primary | ICD-10-CM

## 2023-04-21 PROCEDURE — 76706 US ABDL AORTA SCREEN AAA: CPT

## 2023-05-02 DIAGNOSIS — Z95.810 AICD (AUTOMATIC CARDIOVERTER/DEFIBRILLATOR) PRESENT: ICD-10-CM

## 2023-05-02 DIAGNOSIS — I48.0 PAROXYSMAL ATRIAL FIBRILLATION (HCC): ICD-10-CM

## 2023-05-02 DIAGNOSIS — I47.29 PAROXYSMAL VT (HCC): ICD-10-CM

## 2023-05-05 ENCOUNTER — OFFICE VISIT (OUTPATIENT)
Dept: INTERNAL MEDICINE CLINIC | Facility: CLINIC | Age: 68
End: 2023-05-05
Payer: MEDICARE

## 2023-05-05 VITALS
DIASTOLIC BLOOD PRESSURE: 84 MMHG | HEIGHT: 70 IN | WEIGHT: 196 LBS | RESPIRATION RATE: 14 BRPM | OXYGEN SATURATION: 95 % | SYSTOLIC BLOOD PRESSURE: 135 MMHG | BODY MASS INDEX: 28.06 KG/M2 | TEMPERATURE: 98.2 F | HEART RATE: 80 BPM

## 2023-05-05 DIAGNOSIS — N52.9 ERECTILE DYSFUNCTION, UNSPECIFIED ERECTILE DYSFUNCTION TYPE: ICD-10-CM

## 2023-05-05 DIAGNOSIS — I25.119 ATHEROSCLEROSIS OF NATIVE CORONARY ARTERY OF NATIVE HEART WITH ANGINA PECTORIS (HCC): ICD-10-CM

## 2023-05-05 DIAGNOSIS — E11.59 TYPE 2 DIABETES MELLITUS WITH OTHER CIRCULATORY COMPLICATION, WITHOUT LONG-TERM CURRENT USE OF INSULIN (HCC): Primary | ICD-10-CM

## 2023-05-05 DIAGNOSIS — N18.31 STAGE 3A CHRONIC KIDNEY DISEASE (HCC): ICD-10-CM

## 2023-05-05 PROCEDURE — 3044F HG A1C LEVEL LT 7.0%: CPT | Performed by: INTERNAL MEDICINE

## 2023-05-05 PROCEDURE — 2022F DILAT RTA XM EVC RTNOPTHY: CPT | Performed by: INTERNAL MEDICINE

## 2023-05-05 PROCEDURE — 4004F PT TOBACCO SCREEN RCVD TLK: CPT | Performed by: INTERNAL MEDICINE

## 2023-05-05 PROCEDURE — 99214 OFFICE O/P EST MOD 30 MIN: CPT | Performed by: INTERNAL MEDICINE

## 2023-05-05 PROCEDURE — 3017F COLORECTAL CA SCREEN DOC REV: CPT | Performed by: INTERNAL MEDICINE

## 2023-05-05 PROCEDURE — 1123F ACP DISCUSS/DSCN MKR DOCD: CPT | Performed by: INTERNAL MEDICINE

## 2023-05-05 PROCEDURE — 3075F SYST BP GE 130 - 139MM HG: CPT | Performed by: INTERNAL MEDICINE

## 2023-05-05 PROCEDURE — G8417 CALC BMI ABV UP PARAM F/U: HCPCS | Performed by: INTERNAL MEDICINE

## 2023-05-05 PROCEDURE — 3079F DIAST BP 80-89 MM HG: CPT | Performed by: INTERNAL MEDICINE

## 2023-05-05 PROCEDURE — G8427 DOCREV CUR MEDS BY ELIG CLIN: HCPCS | Performed by: INTERNAL MEDICINE

## 2023-05-05 RX ORDER — TADALAFIL 20 MG/1
20 TABLET ORAL DAILY PRN
Qty: 10 TABLET | Refills: 5 | Status: SHIPPED | OUTPATIENT
Start: 2023-05-05

## 2023-05-05 SDOH — ECONOMIC STABILITY: FOOD INSECURITY: WITHIN THE PAST 12 MONTHS, THE FOOD YOU BOUGHT JUST DIDN'T LAST AND YOU DIDN'T HAVE MONEY TO GET MORE.: NEVER TRUE

## 2023-05-05 SDOH — ECONOMIC STABILITY: INCOME INSECURITY: HOW HARD IS IT FOR YOU TO PAY FOR THE VERY BASICS LIKE FOOD, HOUSING, MEDICAL CARE, AND HEATING?: NOT HARD AT ALL

## 2023-05-05 SDOH — ECONOMIC STABILITY: FOOD INSECURITY: WITHIN THE PAST 12 MONTHS, YOU WORRIED THAT YOUR FOOD WOULD RUN OUT BEFORE YOU GOT MONEY TO BUY MORE.: NEVER TRUE

## 2023-05-05 ASSESSMENT — ANXIETY QUESTIONNAIRES
GAD7 TOTAL SCORE: 0
7. FEELING AFRAID AS IF SOMETHING AWFUL MIGHT HAPPEN: 0
6. BECOMING EASILY ANNOYED OR IRRITABLE: 0
4. TROUBLE RELAXING: 0
5. BEING SO RESTLESS THAT IT IS HARD TO SIT STILL: 0
IF YOU CHECKED OFF ANY PROBLEMS ON THIS QUESTIONNAIRE, HOW DIFFICULT HAVE THESE PROBLEMS MADE IT FOR YOU TO DO YOUR WORK, TAKE CARE OF THINGS AT HOME, OR GET ALONG WITH OTHER PEOPLE: NOT DIFFICULT AT ALL
3. WORRYING TOO MUCH ABOUT DIFFERENT THINGS: 0
2. NOT BEING ABLE TO STOP OR CONTROL WORRYING: 0
1. FEELING NERVOUS, ANXIOUS, OR ON EDGE: 0

## 2023-05-05 ASSESSMENT — PATIENT HEALTH QUESTIONNAIRE - PHQ9
SUM OF ALL RESPONSES TO PHQ QUESTIONS 1-9: 0
1. LITTLE INTEREST OR PLEASURE IN DOING THINGS: 0
SUM OF ALL RESPONSES TO PHQ QUESTIONS 1-9: 0
SUM OF ALL RESPONSES TO PHQ QUESTIONS 1-9: 0
SUM OF ALL RESPONSES TO PHQ9 QUESTIONS 1 & 2: 0
SUM OF ALL RESPONSES TO PHQ QUESTIONS 1-9: 0
2. FEELING DOWN, DEPRESSED OR HOPELESS: 0

## 2023-05-05 NOTE — PROGRESS NOTES
2/13/2025    -Previous medical records and/or labs/tests available to me reviewed, any records outstanding not available requested  -The risks, benefits, and medical necessity of all medications, tests labs, and any other orders that were ordered at today's visit were discussed with the patient including all elective or patient requested orders. Decision to order or not order tests or based on this risk/benefit ratio and medical necessity.  -The patient expresses understanding of the plan as I've explained it to him and is in agreement with the current plan.     Follow Up: 3 months    Total Time: 30 minutes (chart reviewing and in-exam time)    Signed: Kayode Arora D.O.  05/05/23  9:09 AM

## 2023-07-12 ENCOUNTER — OFFICE VISIT (OUTPATIENT)
Age: 68
End: 2023-07-12
Payer: MEDICARE

## 2023-07-12 VITALS
DIASTOLIC BLOOD PRESSURE: 70 MMHG | BODY MASS INDEX: 26.81 KG/M2 | HEIGHT: 70 IN | HEART RATE: 75 BPM | WEIGHT: 187.3 LBS | SYSTOLIC BLOOD PRESSURE: 120 MMHG

## 2023-07-12 DIAGNOSIS — Z95.810 AICD (AUTOMATIC CARDIOVERTER/DEFIBRILLATOR) PRESENT: Primary | ICD-10-CM

## 2023-07-12 DIAGNOSIS — I50.22 SYSTOLIC CHF, CHRONIC (HCC): ICD-10-CM

## 2023-07-12 DIAGNOSIS — I48.0 PAROXYSMAL ATRIAL FIBRILLATION (HCC): ICD-10-CM

## 2023-07-12 DIAGNOSIS — I42.8 NICM (NONISCHEMIC CARDIOMYOPATHY) (HCC): ICD-10-CM

## 2023-07-12 DIAGNOSIS — I47.29 PAROXYSMAL VT (HCC): ICD-10-CM

## 2023-07-12 DIAGNOSIS — I73.9 PVD (PERIPHERAL VASCULAR DISEASE) (HCC): ICD-10-CM

## 2023-07-12 PROCEDURE — 99214 OFFICE O/P EST MOD 30 MIN: CPT | Performed by: INTERNAL MEDICINE

## 2023-07-12 PROCEDURE — 3074F SYST BP LT 130 MM HG: CPT | Performed by: INTERNAL MEDICINE

## 2023-07-12 PROCEDURE — 1123F ACP DISCUSS/DSCN MKR DOCD: CPT | Performed by: INTERNAL MEDICINE

## 2023-07-12 PROCEDURE — 3017F COLORECTAL CA SCREEN DOC REV: CPT | Performed by: INTERNAL MEDICINE

## 2023-07-12 PROCEDURE — 4004F PT TOBACCO SCREEN RCVD TLK: CPT | Performed by: INTERNAL MEDICINE

## 2023-07-12 PROCEDURE — G8428 CUR MEDS NOT DOCUMENT: HCPCS | Performed by: INTERNAL MEDICINE

## 2023-07-12 PROCEDURE — 3078F DIAST BP <80 MM HG: CPT | Performed by: INTERNAL MEDICINE

## 2023-07-12 PROCEDURE — G8417 CALC BMI ABV UP PARAM F/U: HCPCS | Performed by: INTERNAL MEDICINE

## 2023-07-12 NOTE — PROGRESS NOTES
[address] [place] [facility], [address]  PHONE: [phone]     23    NAME:  Alexander Mathew  : 1955  MRN: [id number]       SUBJECTIVE:   Alexander Mathew is a 78 y.o. male seen for a follow up visit regarding the following:     Chief Complaint   Patient presents with    Atrial Fibrillation    Follow-up     2 month        HPI:    Here for CV, SHF/NICM follow up,   [address]  with mod CAD, ICD  after syncope and VT   cSHF (EF 25% )   pAF (likes being just on ASA). Echo 3/2019: EF 40-45%   Echo 2022: EF 30%  LE Stent placement: 2022      [facility] Admission 2018: alcohol induced pancreatitis. His stay was complicated with an ileus and delirium from alcohol withdrawal.      In [facility] center, active working in yards. No CP. BP better now. NO new COLES, SOB, edema. No new angina. He has NYHA Class II sx now. Exercising, feeling well now. Patient denies recent history of orthopnea, PND, excessive dizziness and/or syncope. Still smoking now. Past Medical History, Past Surgical History, Family history, Social History, and Medications were all reviewed with the patient today and updated as necessary.      Current Outpatient Medications   Medication Sig Dispense Refill    empagliflozin (JARDIANCE) 10 MG tablet Take 1 tablet by mouth daily 30 tablet 0    tadalafil (CIALIS) 20 MG tablet Take 1 tablet by mouth daily as needed for Erectile Dysfunction 10 tablet 5    benzonatate (TESSALON) 100 MG capsule TAKE 1 CAPSULE (100MG) 3 TIMES A DAY AS NEEDED FOR COUGH FOR UP TO 7 DAYS      amLODIPine (NORVASC) 10 MG tablet Take 1 tablet by mouth daily 90 tablet 3    rosuvastatin (CRESTOR) 20 MG tablet TAKE ONE TABLET BY MOUTH EVERY NIGHT 90 tablet 3    aspirin 81 MG EC tablet Take 1 tablet by mouth daily      carvedilol (COREG) 12.5 MG tablet Take 1 tablet by mouth 2 times daily (with meals)      diclofenac sodium (VOLTAREN) 1 % GEL Apply 4 g topically 4 times daily       No current

## 2023-08-07 ENCOUNTER — OFFICE VISIT (OUTPATIENT)
Dept: INTERNAL MEDICINE CLINIC | Facility: CLINIC | Age: 68
End: 2023-08-07
Payer: MEDICARE

## 2023-08-07 VITALS
TEMPERATURE: 98.3 F | RESPIRATION RATE: 14 BRPM | SYSTOLIC BLOOD PRESSURE: 141 MMHG | OXYGEN SATURATION: 95 % | HEART RATE: 81 BPM | WEIGHT: 189 LBS | HEIGHT: 70 IN | BODY MASS INDEX: 27.06 KG/M2 | DIASTOLIC BLOOD PRESSURE: 86 MMHG

## 2023-08-07 DIAGNOSIS — N52.9 ERECTILE DYSFUNCTION, UNSPECIFIED ERECTILE DYSFUNCTION TYPE: ICD-10-CM

## 2023-08-07 DIAGNOSIS — E11.59 TYPE 2 DIABETES MELLITUS WITH OTHER CIRCULATORY COMPLICATION, WITHOUT LONG-TERM CURRENT USE OF INSULIN (HCC): ICD-10-CM

## 2023-08-07 DIAGNOSIS — I77.811 ABDOMINAL AORTIC ECTASIA (HCC): ICD-10-CM

## 2023-08-07 DIAGNOSIS — Z72.0 TOBACCO ABUSE DISORDER: ICD-10-CM

## 2023-08-07 DIAGNOSIS — N18.31 STAGE 3A CHRONIC KIDNEY DISEASE (HCC): ICD-10-CM

## 2023-08-07 DIAGNOSIS — I48.0 PAROXYSMAL ATRIAL FIBRILLATION (HCC): ICD-10-CM

## 2023-08-07 DIAGNOSIS — R43.0 LOSS OF SMELL: ICD-10-CM

## 2023-08-07 DIAGNOSIS — E78.2 MIXED HYPERLIPIDEMIA: ICD-10-CM

## 2023-08-07 DIAGNOSIS — I10 HTN (HYPERTENSION), BENIGN: Primary | ICD-10-CM

## 2023-08-07 LAB
ALBUMIN SERPL-MCNC: 4.1 G/DL (ref 3.2–4.6)
ALBUMIN/GLOB SERPL: 1.2 (ref 0.4–1.6)
ALP SERPL-CCNC: 69 U/L (ref 50–136)
ALT SERPL-CCNC: 31 U/L (ref 12–65)
ANION GAP SERPL CALC-SCNC: 3 MMOL/L (ref 2–11)
AST SERPL-CCNC: 18 U/L (ref 15–37)
BASOPHILS # BLD: 0 K/UL (ref 0–0.2)
BASOPHILS NFR BLD: 0 % (ref 0–2)
BILIRUB SERPL-MCNC: 0.5 MG/DL (ref 0.2–1.1)
BUN SERPL-MCNC: 13 MG/DL (ref 8–23)
CALCIUM SERPL-MCNC: 9.5 MG/DL (ref 8.3–10.4)
CHLORIDE SERPL-SCNC: 109 MMOL/L (ref 101–110)
CO2 SERPL-SCNC: 28 MMOL/L (ref 21–32)
CREAT SERPL-MCNC: 1.2 MG/DL (ref 0.8–1.5)
DIFFERENTIAL METHOD BLD: ABNORMAL
EOSINOPHIL # BLD: 0.1 K/UL (ref 0–0.8)
EOSINOPHIL NFR BLD: 2 % (ref 0.5–7.8)
ERYTHROCYTE [DISTWIDTH] IN BLOOD BY AUTOMATED COUNT: 14.8 % (ref 11.9–14.6)
GLOBULIN SER CALC-MCNC: 3.5 G/DL (ref 2.8–4.5)
GLUCOSE SERPL-MCNC: 183 MG/DL (ref 65–100)
HCT VFR BLD AUTO: 45.2 % (ref 41.1–50.3)
HGB BLD-MCNC: 14.8 G/DL (ref 13.6–17.2)
IMM GRANULOCYTES # BLD AUTO: 0.1 K/UL (ref 0–0.5)
IMM GRANULOCYTES NFR BLD AUTO: 1 % (ref 0–5)
LYMPHOCYTES # BLD: 1.6 K/UL (ref 0.5–4.6)
LYMPHOCYTES NFR BLD: 30 % (ref 13–44)
MCH RBC QN AUTO: 31.8 PG (ref 26.1–32.9)
MCHC RBC AUTO-ENTMCNC: 32.7 G/DL (ref 31.4–35)
MCV RBC AUTO: 97.2 FL (ref 82–102)
MONOCYTES # BLD: 0.4 K/UL (ref 0.1–1.3)
MONOCYTES NFR BLD: 7 % (ref 4–12)
NEUTS SEG # BLD: 3 K/UL (ref 1.7–8.2)
NEUTS SEG NFR BLD: 60 % (ref 43–78)
NRBC # BLD: 0 K/UL (ref 0–0.2)
PLATELET # BLD AUTO: 149 K/UL (ref 150–450)
PLATELET COMMENT: ADEQUATE
PMV BLD AUTO: 10.1 FL (ref 9.4–12.3)
POTASSIUM SERPL-SCNC: 4.3 MMOL/L (ref 3.5–5.1)
PROT SERPL-MCNC: 7.6 G/DL (ref 6.3–8.2)
RBC # BLD AUTO: 4.65 M/UL (ref 4.23–5.6)
RBC MORPH BLD: ABNORMAL
SODIUM SERPL-SCNC: 140 MMOL/L (ref 133–143)
WBC # BLD AUTO: 5.2 K/UL (ref 4.3–11.1)
WBC MORPH BLD: ABNORMAL

## 2023-08-07 PROCEDURE — G8427 DOCREV CUR MEDS BY ELIG CLIN: HCPCS | Performed by: INTERNAL MEDICINE

## 2023-08-07 PROCEDURE — 3044F HG A1C LEVEL LT 7.0%: CPT | Performed by: INTERNAL MEDICINE

## 2023-08-07 PROCEDURE — 3017F COLORECTAL CA SCREEN DOC REV: CPT | Performed by: INTERNAL MEDICINE

## 2023-08-07 PROCEDURE — 4004F PT TOBACCO SCREEN RCVD TLK: CPT | Performed by: INTERNAL MEDICINE

## 2023-08-07 PROCEDURE — 1123F ACP DISCUSS/DSCN MKR DOCD: CPT | Performed by: INTERNAL MEDICINE

## 2023-08-07 PROCEDURE — G8417 CALC BMI ABV UP PARAM F/U: HCPCS | Performed by: INTERNAL MEDICINE

## 2023-08-07 PROCEDURE — 3077F SYST BP >= 140 MM HG: CPT | Performed by: INTERNAL MEDICINE

## 2023-08-07 PROCEDURE — 2022F DILAT RTA XM EVC RTNOPTHY: CPT | Performed by: INTERNAL MEDICINE

## 2023-08-07 PROCEDURE — 99214 OFFICE O/P EST MOD 30 MIN: CPT | Performed by: INTERNAL MEDICINE

## 2023-08-07 PROCEDURE — 3079F DIAST BP 80-89 MM HG: CPT | Performed by: INTERNAL MEDICINE

## 2023-08-07 NOTE — PROGRESS NOTES
Zach Phelps D.O. [place] [address]  Tel: [phone]    Office Visit: Follow Up     Patient Name: Luis Antonio Brewer   :  1955   MRN:   [id number]      Today's Date: 23 8:19 AM    Subjective     The patient is a 78y.o.-year-old male who presents for follow-up. Hypertension  -amlodipine 10 mg daily     Hyperlipidemia  -Crestor 20 mg daily     Nonischemic cardiomyopathy/systolic congestive heart failure with AICD  -Last EF 30%  -Coreg 12.5 mg 2 times daily  -Patient had to reschedule his appointment with Dr. Lito Stephens from 2023 to 2023  -Jardiance 10 mg daily   -Patient will continue off ACE/ARB for now per cardiology  -Patient saw cardiology 2023, no changes made at this visit he will follow-up in the clinic for device checks and follow-up 2024     Coronary artery disease  -aspirin, statin, and beta-blocker  -Patient saw cardiology 2023, no changes made at this visit he will follow-up in the clinic for device checks and follow-up 2024     Paroxysmal atrial fibrillation  -Coreg 12.5 mg BID  -aspirin 81 mg daily  -Patient saw cardiology 2023, no changes made at this visit he will follow-up in the clinic for device checks and follow-up 2024     Peripheral vascular disease  -aspirin 81 mg daily  -Crestor 20 mg daily     Tobacco use  -he is down to 2 cigarettes a day, he is not using any help right now     Type 2 diabetes mellitus  -A1c 3/14/2023 was 6.4  -Jardiance 10 mg daily   -he states he has been eating heathy, he has not been exercising much due to a lot of family stuff      Chronic loss of taste and smell x7 years  -ENT attempted to contact the patient 3 times but he did not answer. We gave him the number to call today. Erectile dysfunction  -Cialis 20 mg, he states this is working well for him.      Abdominal aortic aneurysm  -AAA screening on 2023 showed a mild ectasia of the distal

## 2023-08-08 ENCOUNTER — OFFICE VISIT (OUTPATIENT)
Dept: ENT CLINIC | Age: 68
End: 2023-08-08
Payer: MEDICARE

## 2023-08-08 VITALS
WEIGHT: 185 LBS | HEART RATE: 85 BPM | BODY MASS INDEX: 26.48 KG/M2 | HEIGHT: 70 IN | RESPIRATION RATE: 17 BRPM | OXYGEN SATURATION: 97 %

## 2023-08-08 DIAGNOSIS — J34.3 NASAL TURBINATE HYPERTROPHY: ICD-10-CM

## 2023-08-08 DIAGNOSIS — R43.0 ANOSMIA: Primary | ICD-10-CM

## 2023-08-08 LAB
EST. AVERAGE GLUCOSE BLD GHB EST-MCNC: 134 MG/DL
HBA1C MFR BLD: 6.3 % (ref 4.8–5.6)

## 2023-08-08 PROCEDURE — 99203 OFFICE O/P NEW LOW 30 MIN: CPT | Performed by: STUDENT IN AN ORGANIZED HEALTH CARE EDUCATION/TRAINING PROGRAM

## 2023-08-08 PROCEDURE — 3017F COLORECTAL CA SCREEN DOC REV: CPT | Performed by: STUDENT IN AN ORGANIZED HEALTH CARE EDUCATION/TRAINING PROGRAM

## 2023-08-08 PROCEDURE — 31231 NASAL ENDOSCOPY DX: CPT | Performed by: STUDENT IN AN ORGANIZED HEALTH CARE EDUCATION/TRAINING PROGRAM

## 2023-08-08 PROCEDURE — G8427 DOCREV CUR MEDS BY ELIG CLIN: HCPCS | Performed by: STUDENT IN AN ORGANIZED HEALTH CARE EDUCATION/TRAINING PROGRAM

## 2023-08-08 PROCEDURE — 4004F PT TOBACCO SCREEN RCVD TLK: CPT | Performed by: STUDENT IN AN ORGANIZED HEALTH CARE EDUCATION/TRAINING PROGRAM

## 2023-08-08 PROCEDURE — 1123F ACP DISCUSS/DSCN MKR DOCD: CPT | Performed by: STUDENT IN AN ORGANIZED HEALTH CARE EDUCATION/TRAINING PROGRAM

## 2023-08-08 PROCEDURE — G8417 CALC BMI ABV UP PARAM F/U: HCPCS | Performed by: STUDENT IN AN ORGANIZED HEALTH CARE EDUCATION/TRAINING PROGRAM

## 2023-08-08 ASSESSMENT — ENCOUNTER SYMPTOMS
SINUS PRESSURE: 0
STRIDOR: 0
NAUSEA: 0
WHEEZING: 0
EYE PAIN: 0
SHORTNESS OF BREATH: 0
COUGH: 0
EYE ITCHING: 0
FACIAL SWELLING: 0
DIARRHEA: 0
APNEA: 0
EYE DISCHARGE: 0
CONSTIPATION: 0
SINUS PAIN: 0
CHOKING: 0

## 2023-09-19 PROCEDURE — 93296 REM INTERROG EVL PM/IDS: CPT | Performed by: INTERNAL MEDICINE

## 2023-09-19 PROCEDURE — 93295 DEV INTERROG REMOTE 1/2/MLT: CPT | Performed by: INTERNAL MEDICINE

## 2023-10-18 ENCOUNTER — NURSE ONLY (OUTPATIENT)
Age: 68
End: 2023-10-18

## 2023-10-18 DIAGNOSIS — I47.20 VENTRICULAR TACHYCARDIA (HCC): ICD-10-CM

## 2023-10-18 DIAGNOSIS — I42.8 NICM (NONISCHEMIC CARDIOMYOPATHY) (HCC): Primary | ICD-10-CM

## 2023-10-18 DIAGNOSIS — Z95.810 AICD (AUTOMATIC CARDIOVERTER/DEFIBRILLATOR) PRESENT: ICD-10-CM

## 2024-01-09 ENCOUNTER — OFFICE VISIT (OUTPATIENT)
Age: 69
End: 2024-01-09

## 2024-01-09 VITALS
WEIGHT: 183.5 LBS | HEIGHT: 70 IN | SYSTOLIC BLOOD PRESSURE: 122 MMHG | DIASTOLIC BLOOD PRESSURE: 86 MMHG | BODY MASS INDEX: 26.27 KG/M2 | HEART RATE: 80 BPM

## 2024-01-09 DIAGNOSIS — Z95.810 AICD (AUTOMATIC CARDIOVERTER/DEFIBRILLATOR) PRESENT: Primary | ICD-10-CM

## 2024-01-09 DIAGNOSIS — I10 HTN (HYPERTENSION), BENIGN: ICD-10-CM

## 2024-01-09 DIAGNOSIS — I48.0 PAROXYSMAL ATRIAL FIBRILLATION (HCC): ICD-10-CM

## 2024-01-09 DIAGNOSIS — I50.22 SYSTOLIC CHF, CHRONIC (HCC): ICD-10-CM

## 2024-01-09 DIAGNOSIS — I42.8 NICM (NONISCHEMIC CARDIOMYOPATHY) (HCC): ICD-10-CM

## 2024-01-09 DIAGNOSIS — I47.29 PAROXYSMAL VT (HCC): ICD-10-CM

## 2024-01-09 NOTE — PROGRESS NOTES
[address] [facility], [address] [place] [address]  PHONE: [phone]     24    NAME:  Andreas Vazquez  : 1955  MRN: [id number]       SUBJECTIVE:   Andreas Vazquez is a 68 y.o. male seen for a follow up visit regarding the following:     Chief Complaint   Patient presents with    Congestive Heart Failure       HPI:   Here for CV, SHF/NICM follow up,   C  with mod CAD, ICD  after syncope and VT   cSHF (EF 25% )   pAF (likes being just on ASA).   Echo 3/2019: EF 40-45%   Echo 2022: EF 30%  LE Stent placement: 2022      [facility] Admission 2018: alcohol induced pancreatitis. His stay was complicated with an ileus and delirium from alcohol withdrawal.      Still at the gym, no angina.   No CP.  BP better now.  NO new COLES, SOB, edema.   No new angina. He has NYHA Class II sx now.     Exercising, feeling well now.       Patient denies recent history of orthopnea, PND, excessive dizziness and/or syncope.  Still smoking now.          Past Medical History, Past Surgical History, Family history, Social History, and Medications were all reviewed with the patient today and updated as necessary.     Current Outpatient Medications   Medication Sig Dispense Refill    rosuvastatin (CRESTOR) 20 MG tablet TAKE ONE TABLET BY MOUTH EVERY NIGHT 90 tablet 3    empagliflozin (JARDIANCE) 10 MG tablet Take 1 tablet by mouth daily 30 tablet 0    tadalafil (CIALIS) 20 MG tablet Take 1 tablet by mouth daily as needed for Erectile Dysfunction 10 tablet 5    benzonatate (TESSALON) 100 MG capsule TAKE 1 CAPSULE (100MG) 3 TIMES A DAY AS NEEDED FOR COUGH FOR UP TO 7 DAYS      amLODIPine (NORVASC) 10 MG tablet Take 1 tablet by mouth daily 90 tablet 3    aspirin 81 MG EC tablet Take 1 tablet by mouth daily      carvedilol (COREG) 12.5 MG tablet Take 1 tablet by mouth 2 times daily (with meals)      diclofenac sodium (VOLTAREN) 1 % GEL Apply 4 g topically 4 times daily       No current facility-administered medications

## 2024-04-22 ENCOUNTER — OFFICE VISIT (OUTPATIENT)
Dept: INTERNAL MEDICINE CLINIC | Facility: CLINIC | Age: 69
End: 2024-04-22
Payer: MEDICARE

## 2024-04-22 VITALS
HEIGHT: 70 IN | RESPIRATION RATE: 16 BRPM | OXYGEN SATURATION: 97 % | SYSTOLIC BLOOD PRESSURE: 134 MMHG | DIASTOLIC BLOOD PRESSURE: 88 MMHG | BODY MASS INDEX: 26.34 KG/M2 | WEIGHT: 184 LBS | HEART RATE: 85 BPM

## 2024-04-22 DIAGNOSIS — I73.9 PVD (PERIPHERAL VASCULAR DISEASE) (HCC): ICD-10-CM

## 2024-04-22 DIAGNOSIS — I50.22 SYSTOLIC CHF, CHRONIC (HCC): ICD-10-CM

## 2024-04-22 DIAGNOSIS — I48.0 PAROXYSMAL ATRIAL FIBRILLATION (HCC): ICD-10-CM

## 2024-04-22 DIAGNOSIS — I10 HTN (HYPERTENSION), BENIGN: ICD-10-CM

## 2024-04-22 DIAGNOSIS — R10.9 LEFT FLANK PAIN: ICD-10-CM

## 2024-04-22 DIAGNOSIS — I42.8 NICM (NONISCHEMIC CARDIOMYOPATHY) (HCC): ICD-10-CM

## 2024-04-22 DIAGNOSIS — N52.9 ERECTILE DYSFUNCTION, UNSPECIFIED ERECTILE DYSFUNCTION TYPE: ICD-10-CM

## 2024-04-22 DIAGNOSIS — Z87.891 PERSONAL HISTORY OF TOBACCO USE: ICD-10-CM

## 2024-04-22 DIAGNOSIS — R43.0 LOSS OF SMELL: ICD-10-CM

## 2024-04-22 DIAGNOSIS — N18.31 STAGE 3A CHRONIC KIDNEY DISEASE (HCC): ICD-10-CM

## 2024-04-22 DIAGNOSIS — Z72.0 TOBACCO ABUSE DISORDER: ICD-10-CM

## 2024-04-22 DIAGNOSIS — E11.59 TYPE 2 DIABETES MELLITUS WITH OTHER CIRCULATORY COMPLICATION, WITHOUT LONG-TERM CURRENT USE OF INSULIN (HCC): Primary | ICD-10-CM

## 2024-04-22 DIAGNOSIS — Z00.00 MEDICARE ANNUAL WELLNESS VISIT, SUBSEQUENT: ICD-10-CM

## 2024-04-22 DIAGNOSIS — I77.811 ABDOMINAL AORTIC ECTASIA (HCC): ICD-10-CM

## 2024-04-22 DIAGNOSIS — E78.2 MIXED HYPERLIPIDEMIA: ICD-10-CM

## 2024-04-22 LAB
ANION GAP SERPL CALC-SCNC: 3 MMOL/L (ref 2–11)
BUN SERPL-MCNC: 20 MG/DL (ref 8–23)
CALCIUM SERPL-MCNC: 9.8 MG/DL (ref 8.3–10.4)
CHLORIDE SERPL-SCNC: 109 MMOL/L (ref 103–113)
CO2 SERPL-SCNC: 28 MMOL/L (ref 21–32)
CREAT SERPL-MCNC: 1.1 MG/DL (ref 0.8–1.5)
ERYTHROCYTE [DISTWIDTH] IN BLOOD BY AUTOMATED COUNT: 14.5 % (ref 11.9–14.6)
EST. AVERAGE GLUCOSE BLD GHB EST-MCNC: 128 MG/DL
GLUCOSE SERPL-MCNC: 129 MG/DL (ref 65–100)
HBA1C MFR BLD: 6.1 % (ref 4.8–5.6)
HCT VFR BLD AUTO: 45.5 % (ref 41.1–50.3)
HGB BLD-MCNC: 15.1 G/DL (ref 13.6–17.2)
MCH RBC QN AUTO: 32.2 PG (ref 26.1–32.9)
MCHC RBC AUTO-ENTMCNC: 33.2 G/DL (ref 31.4–35)
MCV RBC AUTO: 97 FL (ref 82–102)
NRBC # BLD: 0 K/UL (ref 0–0.2)
PLATELET # BLD AUTO: 176 K/UL (ref 150–450)
PMV BLD AUTO: 9.2 FL (ref 9.4–12.3)
POTASSIUM SERPL-SCNC: 4.4 MMOL/L (ref 3.5–5.1)
RBC # BLD AUTO: 4.69 M/UL (ref 4.23–5.6)
SODIUM SERPL-SCNC: 140 MMOL/L (ref 136–146)
WBC # BLD AUTO: 5.3 K/UL (ref 4.3–11.1)

## 2024-04-22 PROCEDURE — G0439 PPPS, SUBSEQ VISIT: HCPCS | Performed by: INTERNAL MEDICINE

## 2024-04-22 PROCEDURE — 1123F ACP DISCUSS/DSCN MKR DOCD: CPT | Performed by: INTERNAL MEDICINE

## 2024-04-22 PROCEDURE — 3075F SYST BP GE 130 - 139MM HG: CPT | Performed by: INTERNAL MEDICINE

## 2024-04-22 PROCEDURE — G0296 VISIT TO DETERM LDCT ELIG: HCPCS | Performed by: INTERNAL MEDICINE

## 2024-04-22 PROCEDURE — 3079F DIAST BP 80-89 MM HG: CPT | Performed by: INTERNAL MEDICINE

## 2024-04-22 PROCEDURE — 99214 OFFICE O/P EST MOD 30 MIN: CPT | Performed by: INTERNAL MEDICINE

## 2024-04-22 RX ORDER — TADALAFIL 20 MG/1
20 TABLET ORAL DAILY PRN
Qty: 10 TABLET | Refills: 5 | Status: SHIPPED | OUTPATIENT
Start: 2024-04-22

## 2024-04-22 RX ORDER — ROSUVASTATIN CALCIUM 20 MG/1
20 TABLET, COATED ORAL NIGHTLY
Qty: 90 TABLET | Refills: 3 | Status: SHIPPED | OUTPATIENT
Start: 2024-04-22

## 2024-04-22 ASSESSMENT — PATIENT HEALTH QUESTIONNAIRE - PHQ9
SUM OF ALL RESPONSES TO PHQ9 QUESTIONS 1 & 2: 0
SUM OF ALL RESPONSES TO PHQ QUESTIONS 1-9: 0
2. FEELING DOWN, DEPRESSED OR HOPELESS: NOT AT ALL
SUM OF ALL RESPONSES TO PHQ QUESTIONS 1-9: 0
1. LITTLE INTEREST OR PLEASURE IN DOING THINGS: NOT AT ALL
SUM OF ALL RESPONSES TO PHQ QUESTIONS 1-9: 0
SUM OF ALL RESPONSES TO PHQ QUESTIONS 1-9: 0

## 2024-04-22 NOTE — PROGRESS NOTES
mass index is 26.4 kg/m².               Allergies   Allergen Reactions    Other Other (See Comments)     ants     Prior to Visit Medications    Medication Sig Taking? Authorizing Provider   tadalafil (CIALIS) 20 MG tablet Take 1 tablet by mouth daily as needed for Erectile Dysfunction Yes Carlos Varma DO   rosuvastatin (CRESTOR) 20 MG tablet Take 1 tablet by mouth nightly Yes Carlos Varma DO   empagliflozin (JARDIANCE) 10 MG tablet Take 1 tablet by mouth daily Yes Carlos Varma DO   amLODIPine (NORVASC) 10 MG tablet Take 1 tablet by mouth daily Yes Yehuda Thorne DO   aspirin 81 MG EC tablet Take 1 tablet by mouth daily Yes Automatic Reconciliation, Ar   carvedilol (COREG) 12.5 MG tablet Take 1 tablet by mouth 2 times daily (with meals) Yes Automatic Reconciliation, Ar   benzonatate (TESSALON) 100 MG capsule TAKE 1 CAPSULE (100MG) 3 TIMES A DAY AS NEEDED FOR COUGH FOR UP TO 7 DAYS  Patient not taking: Reported on 4/22/2024  Provider, MD Elliot   diclofenac sodium (VOLTAREN) 1 % GEL Apply 4 g topically 4 times daily  Patient not taking: Reported on 4/22/2024  Automatic Reconciliation, Ar       CareTeam (Including outside providers/suppliers regularly involved in providing care):   Patient Care Team:  Carlos Varma DO as PCP - General (Internal Medicine)  Carlos Varma DO as PCP - Empaneled Provider     Reviewed and updated this visit:  Allergies  Meds

## 2024-04-22 NOTE — PATIENT INSTRUCTIONS

## 2024-04-23 NOTE — RESULT ENCOUNTER NOTE
Patient's hemoglobin A1c has improved from 8 months ago it is now 6.1 down from 6.3, this is a great improvement he should continue eating healthy and getting enough exercise to keep this number stable.  The rest of his labs were normal.

## 2024-05-08 ENCOUNTER — HOSPITAL ENCOUNTER (OUTPATIENT)
Dept: CT IMAGING | Age: 69
Discharge: HOME | End: 2024-05-11
Attending: INTERNAL MEDICINE
Payer: MEDICARE

## 2024-05-08 ENCOUNTER — HOSPITAL ENCOUNTER (EMERGENCY)
Age: 69
Discharge: HOME | End: 2024-05-08
Attending: EMERGENCY MEDICINE
Payer: MEDICARE

## 2024-05-08 ENCOUNTER — HOSPITAL ENCOUNTER (OUTPATIENT)
Dept: ULTRASOUND IMAGING | Age: 69
Discharge: HOME | End: 2024-05-11
Attending: INTERNAL MEDICINE
Payer: MEDICARE

## 2024-05-08 VITALS
HEART RATE: 81 BPM | DIASTOLIC BLOOD PRESSURE: 82 MMHG | OXYGEN SATURATION: 100 % | TEMPERATURE: 98.8 F | SYSTOLIC BLOOD PRESSURE: 116 MMHG | RESPIRATION RATE: 18 BRPM | BODY MASS INDEX: 26.54 KG/M2 | WEIGHT: 185 LBS

## 2024-05-08 DIAGNOSIS — R10.9 LEFT FLANK PAIN: ICD-10-CM

## 2024-05-08 DIAGNOSIS — Z87.891 PERSONAL HISTORY OF TOBACCO USE: ICD-10-CM

## 2024-05-08 DIAGNOSIS — I77.811 ABDOMINAL AORTIC ECTASIA (HCC): ICD-10-CM

## 2024-05-08 DIAGNOSIS — L73.9 FOLLICULITIS: Primary | ICD-10-CM

## 2024-05-08 PROCEDURE — 74176 CT ABD & PELVIS W/O CONTRAST: CPT

## 2024-05-08 PROCEDURE — 93976 VASCULAR STUDY: CPT | Performed by: RADIOLOGY

## 2024-05-08 PROCEDURE — 93976 VASCULAR STUDY: CPT

## 2024-05-08 PROCEDURE — 74176 CT ABD & PELVIS W/O CONTRAST: CPT | Performed by: RADIOLOGY

## 2024-05-08 PROCEDURE — 99283 EMERGENCY DEPT VISIT LOW MDM: CPT

## 2024-05-08 PROCEDURE — 71271 CT THORAX LUNG CANCER SCR C-: CPT

## 2024-05-08 RX ORDER — CEPHALEXIN 500 MG/1
500 CAPSULE ORAL 2 TIMES DAILY
Qty: 14 CAPSULE | Refills: 0 | Status: SHIPPED | OUTPATIENT
Start: 2024-05-08 | End: 2024-05-15

## 2024-05-08 NOTE — ED TRIAGE NOTES
Pt was working in the yard when he got into the cactus leafs. Pt states this was 2-3 weeks ago. Pt states his rash from that day has gotten worse while his wife's has gotten better. Pt has rash on hands, belly, arms and legs. Pt denies any SOB or itchy throat since incident.

## 2024-05-08 NOTE — RESULT ENCOUNTER NOTE
Patient's abdominal ultrasound showed aortic ectasia or mild dilation of aorta but no discrete aneurysm.  Recommendations are to repeat ultrasound in 1 year to make sure it is staying stable in size.  We will order this next year when he is due for.

## 2024-05-08 NOTE — ED PROVIDER NOTES
Insert stent peripheral artery performed by Jaime Triplett MD at [facility] CARDIAC CATH LAB    OTHER SURGICAL HISTORY  Feb, March 2012    ANSELMO    IN UNLISTED PROCEDURE CARDIAC SURGERY          Social History     Socioeconomic History    Marital status:    Tobacco Use    Smoking status: Every Day     Current packs/day: 0.50     Average packs/day: 0.5 packs/day for 50.0 years (25.0 ttl pk-yrs)     Types: Cigarettes    Smokeless tobacco: Never   Vaping Use    Vaping Use: Never used   Substance and Sexual Activity    Alcohol use: Yes     Comment: occassionally    Drug use: Yes     Types: Marijuana (Weed)     Social Determinants of Health     Financial Resource Strain: Low Risk  (5/5/2023)    Overall Financial Resource Strain (CARDIA)     Difficulty of Paying Living Expenses: Not hard at all   Transportation Needs: Unknown (5/5/2023)    PRAPARE - Transportation     Lack of Transportation (Non-Medical): No   Physical Activity: Sufficiently Active (4/22/2024)    Exercise Vital Sign     Days of Exercise per Week: 3 days     Minutes of Exercise per Session: 130 min   Housing Stability: Unknown (5/5/2023)    Housing Stability Vital Sign     Unstable Housing in the Last Year: No        Previous Medications    AMLODIPINE (NORVASC) 10 MG TABLET    Take 1 tablet by mouth daily    ASPIRIN 81 MG EC TABLET    Take 1 tablet by mouth daily    BENZONATATE (TESSALON) 100 MG CAPSULE    TAKE 1 CAPSULE (100MG) 3 TIMES A DAY AS NEEDED FOR COUGH FOR UP TO 7 DAYS    CARVEDILOL (COREG) 12.5 MG TABLET    Take 1 tablet by mouth 2 times daily (with meals)    DICLOFENAC SODIUM (VOLTAREN) 1 % GEL    Apply 4 g topically 4 times daily    EMPAGLIFLOZIN (JARDIANCE) 10 MG TABLET    Take 1 tablet by mouth daily    ROSUVASTATIN (CRESTOR) 20 MG TABLET    Take 1 tablet by mouth nightly    TADALAFIL (CIALIS) 20 MG TABLET    Take 1 tablet by mouth daily as needed for Erectile Dysfunction        Results for orders placed or performed during the hospital

## 2024-07-15 ENCOUNTER — OFFICE VISIT (OUTPATIENT)
Age: 69
End: 2024-07-15
Payer: MEDICARE

## 2024-07-15 VITALS
DIASTOLIC BLOOD PRESSURE: 80 MMHG | SYSTOLIC BLOOD PRESSURE: 126 MMHG | HEIGHT: 70 IN | WEIGHT: 188 LBS | BODY MASS INDEX: 26.92 KG/M2 | HEART RATE: 88 BPM

## 2024-07-15 DIAGNOSIS — I50.22 SYSTOLIC CHF, CHRONIC (HCC): Primary | ICD-10-CM

## 2024-07-15 DIAGNOSIS — I42.8 NICM (NONISCHEMIC CARDIOMYOPATHY) (HCC): ICD-10-CM

## 2024-07-15 DIAGNOSIS — I73.9 PVD (PERIPHERAL VASCULAR DISEASE) (HCC): ICD-10-CM

## 2024-07-15 DIAGNOSIS — I10 HTN (HYPERTENSION), BENIGN: ICD-10-CM

## 2024-07-15 DIAGNOSIS — Z95.810 AICD (AUTOMATIC CARDIOVERTER/DEFIBRILLATOR) PRESENT: ICD-10-CM

## 2024-07-15 DIAGNOSIS — I48.0 PAROXYSMAL ATRIAL FIBRILLATION (HCC): ICD-10-CM

## 2024-07-15 DIAGNOSIS — I47.29 PAROXYSMAL VT (HCC): ICD-10-CM

## 2024-07-15 PROCEDURE — 1123F ACP DISCUSS/DSCN MKR DOCD: CPT | Performed by: INTERNAL MEDICINE

## 2024-07-15 PROCEDURE — 3079F DIAST BP 80-89 MM HG: CPT | Performed by: INTERNAL MEDICINE

## 2024-07-15 PROCEDURE — 3074F SYST BP LT 130 MM HG: CPT | Performed by: INTERNAL MEDICINE

## 2024-07-15 PROCEDURE — 99214 OFFICE O/P EST MOD 30 MIN: CPT | Performed by: INTERNAL MEDICINE

## 2024-07-15 NOTE — PROGRESS NOTES
2 [facility], [address] [place] [address]  PHONE: [phone]     07/15/24    NAME:  Andreas Vazquez  : 1955  MRN: [id number]       SUBJECTIVE:   Andreas Vazquez is a 68 y.o. male seen for a follow up visit regarding the following:     Chief Complaint   Patient presents with    Atrial Fibrillation    Congestive Heart Failure    Follow-up       HPI:   Here for CV, SHF/NICM follow up,   LHC  with mod CAD, ICD  after syncope and VT   cSHF (EF 25% )   pAF (likes being just on ASA).   Echo 3/2019: EF 40-45%   Echo 2022: EF 30%  LE Stent placement: 2022      [facility] Admission 2018: alcohol induced pancreatitis. His stay was complicated with an ileus and delirium from alcohol withdrawal.      Not in the gym now.  No new angina.  No CP.  BP better now.  NO new COLES, SOB, edema.   No new angina. He has NYHA Class II sx now.     Patient denies recent history of orthopnea, PND, excessive dizziness and/or syncope.  Still smoking now.            Past Medical History, Past Surgical History, Family history, Social History, and Medications were all reviewed with the patient today and updated as necessary.     Current Outpatient Medications   Medication Sig Dispense Refill    tadalafil (CIALIS) 20 MG tablet Take 1 tablet by mouth daily as needed for Erectile Dysfunction 10 tablet 5    rosuvastatin (CRESTOR) 20 MG tablet Take 1 tablet by mouth nightly 90 tablet 3    empagliflozin (JARDIANCE) 10 MG tablet Take 1 tablet by mouth daily 30 tablet 0    benzonatate (TESSALON) 100 MG capsule       amLODIPine (NORVASC) 10 MG tablet Take 1 tablet by mouth daily 90 tablet 3    aspirin 81 MG EC tablet Take 1 tablet by mouth daily      carvedilol (COREG) 12.5 MG tablet Take 1 tablet by mouth 2 times daily (with meals)      diclofenac sodium (VOLTAREN) 1 % GEL Apply 4 g topically 4 times daily       No current facility-administered medications for this visit.        Allergies   Allergen Reactions    Other Other

## 2024-08-19 ENCOUNTER — OFFICE VISIT (OUTPATIENT)
Dept: INTERNAL MEDICINE CLINIC | Facility: CLINIC | Age: 69
End: 2024-08-19
Payer: MEDICARE

## 2024-08-19 VITALS
BODY MASS INDEX: 25.34 KG/M2 | DIASTOLIC BLOOD PRESSURE: 76 MMHG | SYSTOLIC BLOOD PRESSURE: 112 MMHG | OXYGEN SATURATION: 99 % | HEART RATE: 87 BPM | WEIGHT: 177 LBS | HEIGHT: 70 IN | TEMPERATURE: 98 F

## 2024-08-19 DIAGNOSIS — I73.9 PVD (PERIPHERAL VASCULAR DISEASE) (HCC): ICD-10-CM

## 2024-08-19 DIAGNOSIS — E11.59 TYPE 2 DIABETES MELLITUS WITH OTHER CIRCULATORY COMPLICATION, WITHOUT LONG-TERM CURRENT USE OF INSULIN (HCC): Primary | ICD-10-CM

## 2024-08-19 DIAGNOSIS — I48.0 PAROXYSMAL ATRIAL FIBRILLATION (HCC): ICD-10-CM

## 2024-08-19 DIAGNOSIS — Z72.0 TOBACCO ABUSE DISORDER: ICD-10-CM

## 2024-08-19 DIAGNOSIS — E78.2 MIXED HYPERLIPIDEMIA: ICD-10-CM

## 2024-08-19 DIAGNOSIS — I77.811 ABDOMINAL AORTIC ECTASIA (HCC): ICD-10-CM

## 2024-08-19 DIAGNOSIS — N18.31 STAGE 3A CHRONIC KIDNEY DISEASE (HCC): ICD-10-CM

## 2024-08-19 DIAGNOSIS — I10 HTN (HYPERTENSION), BENIGN: ICD-10-CM

## 2024-08-19 DIAGNOSIS — I42.8 NICM (NONISCHEMIC CARDIOMYOPATHY) (HCC): ICD-10-CM

## 2024-08-19 DIAGNOSIS — N52.9 ERECTILE DYSFUNCTION, UNSPECIFIED ERECTILE DYSFUNCTION TYPE: ICD-10-CM

## 2024-08-19 LAB
ALBUMIN SERPL-MCNC: 4.2 G/DL (ref 3.2–4.6)
ALBUMIN/GLOB SERPL: 1.3 (ref 1–1.9)
ALP SERPL-CCNC: 62 U/L (ref 40–129)
ALT SERPL-CCNC: 73 U/L (ref 12–65)
ANION GAP SERPL CALC-SCNC: 11 MMOL/L (ref 9–18)
AST SERPL-CCNC: 49 U/L (ref 15–37)
BASOPHILS # BLD: 0 K/UL (ref 0–0.2)
BASOPHILS NFR BLD: 1 % (ref 0–2)
BILIRUB SERPL-MCNC: 0.4 MG/DL (ref 0–1.2)
BUN SERPL-MCNC: 9 MG/DL (ref 8–23)
CALCIUM SERPL-MCNC: 10 MG/DL (ref 8.8–10.2)
CHLORIDE SERPL-SCNC: 103 MMOL/L (ref 98–107)
CHOLEST SERPL-MCNC: 161 MG/DL (ref 0–200)
CO2 SERPL-SCNC: 27 MMOL/L (ref 20–28)
CREAT SERPL-MCNC: 1.2 MG/DL (ref 0.8–1.3)
DIFFERENTIAL METHOD BLD: ABNORMAL
EOSINOPHIL # BLD: 0.2 K/UL (ref 0–0.8)
EOSINOPHIL NFR BLD: 5 % (ref 0.5–7.8)
ERYTHROCYTE [DISTWIDTH] IN BLOOD BY AUTOMATED COUNT: 15.9 % (ref 11.9–14.6)
EST. AVERAGE GLUCOSE BLD GHB EST-MCNC: 132 MG/DL
GLOBULIN SER CALC-MCNC: 3.2 G/DL (ref 2.3–3.5)
GLUCOSE SERPL-MCNC: 158 MG/DL (ref 70–99)
HBA1C MFR BLD: 6.2 % (ref 0–5.6)
HCT VFR BLD AUTO: 45.5 % (ref 41.1–50.3)
HDLC SERPL-MCNC: 35 MG/DL (ref 40–60)
HDLC SERPL: 4.6 (ref 0–5)
HGB BLD-MCNC: 15.2 G/DL (ref 13.6–17.2)
IMM GRANULOCYTES # BLD AUTO: 0 K/UL (ref 0–0.5)
IMM GRANULOCYTES NFR BLD AUTO: 0 % (ref 0–5)
LDLC SERPL CALC-MCNC: 85 MG/DL (ref 0–100)
LYMPHOCYTES # BLD: 1.3 K/UL (ref 0.5–4.6)
LYMPHOCYTES NFR BLD: 30 % (ref 13–44)
MCH RBC QN AUTO: 32.6 PG (ref 26.1–32.9)
MCHC RBC AUTO-ENTMCNC: 33.4 G/DL (ref 31.4–35)
MCV RBC AUTO: 97.6 FL (ref 82–102)
MONOCYTES # BLD: 0.5 K/UL (ref 0.1–1.3)
MONOCYTES NFR BLD: 12 % (ref 4–12)
NEUTS SEG # BLD: 2.4 K/UL (ref 1.7–8.2)
NEUTS SEG NFR BLD: 52 % (ref 43–78)
NRBC # BLD: 0 K/UL (ref 0–0.2)
PLATELET # BLD AUTO: 154 K/UL (ref 150–450)
PMV BLD AUTO: 9.6 FL (ref 9.4–12.3)
POTASSIUM SERPL-SCNC: 4.7 MMOL/L (ref 3.5–5.1)
PROT SERPL-MCNC: 7.3 G/DL (ref 6.3–8.2)
RBC # BLD AUTO: 4.66 M/UL (ref 4.23–5.6)
SODIUM SERPL-SCNC: 141 MMOL/L (ref 136–145)
TRIGL SERPL-MCNC: 208 MG/DL (ref 0–150)
VLDLC SERPL CALC-MCNC: 42 MG/DL (ref 6–23)
WBC # BLD AUTO: 4.4 K/UL (ref 4.3–11.1)

## 2024-08-19 PROCEDURE — 3074F SYST BP LT 130 MM HG: CPT | Performed by: INTERNAL MEDICINE

## 2024-08-19 PROCEDURE — G2211 COMPLEX E/M VISIT ADD ON: HCPCS | Performed by: INTERNAL MEDICINE

## 2024-08-19 PROCEDURE — 3078F DIAST BP <80 MM HG: CPT | Performed by: INTERNAL MEDICINE

## 2024-08-19 PROCEDURE — 3044F HG A1C LEVEL LT 7.0%: CPT | Performed by: INTERNAL MEDICINE

## 2024-08-19 PROCEDURE — 99214 OFFICE O/P EST MOD 30 MIN: CPT | Performed by: INTERNAL MEDICINE

## 2024-08-19 PROCEDURE — 1123F ACP DISCUSS/DSCN MKR DOCD: CPT | Performed by: INTERNAL MEDICINE

## 2024-08-19 SDOH — ECONOMIC STABILITY: FOOD INSECURITY: WITHIN THE PAST 12 MONTHS, THE FOOD YOU BOUGHT JUST DIDN'T LAST AND YOU DIDN'T HAVE MONEY TO GET MORE.: NEVER TRUE

## 2024-08-19 SDOH — ECONOMIC STABILITY: INCOME INSECURITY: HOW HARD IS IT FOR YOU TO PAY FOR THE VERY BASICS LIKE FOOD, HOUSING, MEDICAL CARE, AND HEATING?: NOT VERY HARD

## 2024-08-19 SDOH — ECONOMIC STABILITY: FOOD INSECURITY: WITHIN THE PAST 12 MONTHS, YOU WORRIED THAT YOUR FOOD WOULD RUN OUT BEFORE YOU GOT MONEY TO BUY MORE.: NEVER TRUE

## 2024-08-19 ASSESSMENT — PATIENT HEALTH QUESTIONNAIRE - PHQ9
1. LITTLE INTEREST OR PLEASURE IN DOING THINGS: NOT AT ALL
SUM OF ALL RESPONSES TO PHQ QUESTIONS 1-9: 0
SUM OF ALL RESPONSES TO PHQ QUESTIONS 1-9: 0
SUM OF ALL RESPONSES TO PHQ9 QUESTIONS 1 & 2: 0
2. FEELING DOWN, DEPRESSED OR HOPELESS: NOT AT ALL
SUM OF ALL RESPONSES TO PHQ QUESTIONS 1-9: 0
SUM OF ALL RESPONSES TO PHQ QUESTIONS 1-9: 0

## 2024-08-19 NOTE — PROGRESS NOTES
Diabetic foot exam done today 08/19/2024. All pulses and sensations felt. Patient states he goes to the [facility] and they exam his feet and cut his toenails. No sores or callus present. Patient has no numbness or tingling in his feet either.

## 2024-08-19 NOTE — PROGRESS NOTES
Carlos Varma D.O.   [place] [address]  Tel: [phone]    Office Visit: Follow Up     Patient Name: Andreas Vazquez   :  1955   MRN:   [id number]      Today's Date: 24 8:36 AM    Subjective     The patient is a 68 y.o.-year-old male who presents for follow-up.    Primary hypertension  -amlodipine 10 mg daily  -Coreg 12.5 mg 2 times daily     Hyperlipidemia  -Crestor 20 mg daily  -lipid panel from 2022 showed tchol 166, HDL 38, LDL 99.8, trigs 141, VLSL 28.2  -Patient states he is trying to eat healthy but not exercising as much as he used to or would like to.      Nonischemic cardiomyopathy/systolic congestive heart failure with AICD  -Last EF 30%  -Coreg 12.5 mg 2 times daily  -Jardiance 10 mg daily   -Patient had follow-up with cardiology on 7/15/2024, plan at this time was to continue current treatment plan and continue holding ACE/ARB due to past history of CKD.  Patient will continue GDMT.  -Follow up with cardiology 01/15/2025     Coronary artery disease  -History of left heart catheterization in  with moderate CAD, ICD placed in  after an episode of syncope and V. Tach  -LAD stent placement in 2022  -Aspirin 81 mg daily  -Crestor 20 mg daily  -Coreg 12.5 mg twice daily  -Patient last saw cardiology 7/15/2024, plan at this time was to remain on aspirin, statin and beta-blocker and follow-up for angina as needed.  -Follow up with cardiology 01/15/2025     Paroxysmal atrial fibrillation  -Coreg 12.5 mg BID  -aspirin 81 mg daily  -Patient saw cardiology on 2024, no changes made at this time.  -Patient has follow-up with cardiology on 7/15/2024     Peripheral vascular disease  -aspirin 81 mg daily  -Crestor 20 mg daily  -Denies any lower extremity pain      Tobacco use  -he states he is smoking 3-4 cigarettes a day, he states he is working on quitting.      Type 2 diabetes mellitus  -Hemoglobin A1c on 2024 was

## 2024-08-20 DIAGNOSIS — E78.2 MIXED HYPERLIPIDEMIA: Primary | ICD-10-CM

## 2024-08-20 RX ORDER — ICOSAPENT ETHYL 1 G/1
2 CAPSULE ORAL 2 TIMES DAILY
Qty: 360 CAPSULE | Refills: 1 | Status: SHIPPED | OUTPATIENT
Start: 2024-08-20 | End: 2025-02-16

## 2024-08-20 NOTE — RESULT ENCOUNTER NOTE
Patient's hemoglobin A1c is 6.2, which is stable from 4 months ago.  He should continue to focus on a healthy and getting enough exercise to keep this number around 6.  Patient's ALT and AST which are liver enzymes were mildly elevated, we will continue to monitor these.  Patient's cholesterol panel has slightly worsened with a triglyceride level of 208 up from 141, again he focus on eating healthy and getting enough exercise.

## 2024-09-15 ENCOUNTER — TELEPHONE (OUTPATIENT)
Dept: CARDIOLOGY | Age: 69
End: 2024-09-15

## 2024-09-23 PROCEDURE — 93296 REM INTERROG EVL PM/IDS: CPT | Performed by: INTERNAL MEDICINE

## 2024-09-23 PROCEDURE — 93295 DEV INTERROG REMOTE 1/2/MLT: CPT | Performed by: INTERNAL MEDICINE

## 2025-01-15 ENCOUNTER — NURSE ONLY (OUTPATIENT)
Age: 70
End: 2025-01-15

## 2025-01-15 ENCOUNTER — OFFICE VISIT (OUTPATIENT)
Age: 70
End: 2025-01-15

## 2025-01-15 VITALS
HEART RATE: 88 BPM | SYSTOLIC BLOOD PRESSURE: 126 MMHG | DIASTOLIC BLOOD PRESSURE: 84 MMHG | BODY MASS INDEX: 25.34 KG/M2 | WEIGHT: 177 LBS | HEIGHT: 70 IN

## 2025-01-15 DIAGNOSIS — I48.0 PAROXYSMAL ATRIAL FIBRILLATION (HCC): ICD-10-CM

## 2025-01-15 DIAGNOSIS — I10 HTN (HYPERTENSION), BENIGN: ICD-10-CM

## 2025-01-15 DIAGNOSIS — I50.22 SYSTOLIC CHF, CHRONIC (HCC): Primary | ICD-10-CM

## 2025-01-15 DIAGNOSIS — I47.29 PAROXYSMAL VT (HCC): Primary | ICD-10-CM

## 2025-01-15 DIAGNOSIS — Z95.810 AICD (AUTOMATIC CARDIOVERTER/DEFIBRILLATOR) PRESENT: ICD-10-CM

## 2025-01-15 DIAGNOSIS — E78.2 MIXED HYPERLIPIDEMIA: ICD-10-CM

## 2025-01-15 DIAGNOSIS — I42.8 NICM (NONISCHEMIC CARDIOMYOPATHY) (HCC): ICD-10-CM

## 2025-01-15 DIAGNOSIS — I73.9 PVD (PERIPHERAL VASCULAR DISEASE) (HCC): ICD-10-CM

## 2025-01-15 DIAGNOSIS — N18.31 STAGE 3A CHRONIC KIDNEY DISEASE (HCC): ICD-10-CM

## 2025-01-15 PROCEDURE — 1126F AMNT PAIN NOTED NONE PRSNT: CPT | Performed by: INTERNAL MEDICINE

## 2025-01-15 PROCEDURE — 1123F ACP DISCUSS/DSCN MKR DOCD: CPT | Performed by: INTERNAL MEDICINE

## 2025-01-15 PROCEDURE — 3074F SYST BP LT 130 MM HG: CPT | Performed by: INTERNAL MEDICINE

## 2025-01-15 PROCEDURE — 99214 OFFICE O/P EST MOD 30 MIN: CPT | Performed by: INTERNAL MEDICINE

## 2025-01-15 PROCEDURE — 3079F DIAST BP 80-89 MM HG: CPT | Performed by: INTERNAL MEDICINE

## 2025-01-15 NOTE — PROGRESS NOTES
TRIG 75 07/20/2020     Lab Results   Component Value Date    HDL 35 (L) 08/19/2024    HDL 38 (L) 07/21/2022    HDL 40 07/20/2020     No components found for: \"LDLCHOLESTEROL\", \"LDLCALC\"  Lab Results   Component Value Date    VLDL 42 (H) 08/19/2024    VLDL 28.2 (H) 07/21/2022    VLDL 15 07/20/2020     Lab Results   Component Value Date    CHOLHDLRATIO 4.6 08/19/2024    CHOLHDLRATIO 4.4 07/21/2022    CHOLHDLRATIO 3.9 07/20/2020     Lab Results   Component Value Date    LDL 85 08/19/2024 01/20/22    TRANSTHORACIC ECHOCARDIOGRAM (TTE) COMPLETE (CONTRAST/BUBBLE/3D PRN) 01/21/2022 12:58 PM, 01/21/2022 12:00 AM (Final)    Narrative  This is a summary report. The complete report is available in the patient's medical record. If you cannot access the medical record, please contact the sending organization for a detailed fax or copy.      Left Ventricle: Left ventricle size is normal. Normal wall thickness. Moderate global hypokinesis present. Moderately reduced left ventricular systolic function with a visually estimated EF of 25 - 30%. Grade I diastolic dysfunction with normal LAP.    Right Ventricle: Right ventricle is mildly dilated. RV basal diameter is 3.8 cm. RV mid diameter is 3.2 cm. Mildly reduced systolic function.    Aortic Valve: Mildly calcified cusps.    Mitral Valve: Mild transvalvular regurgitation.    Signed by: Yehuda Thorne on 1/21/2022 12:58 PM, Signed by: Unknown Provider Result on 1/21/2022 12:00 AM        I have Independently reviewed prior care notes, any ER records available, cardiac testing, labs and results with the patient and before seeing the patient today.  Also independently reviewed outside records when available.       ASSESSMENT:    Andreas was seen today for congestive heart failure, atrial fibrillation and follow-up.    Diagnoses and all orders for this visit:    Systolic CHF, chronic (HCC)    PVD (peripheral vascular disease) (HCC)    Paroxysmal atrial fibrillation

## 2025-02-17 ENCOUNTER — OFFICE VISIT (OUTPATIENT)
Dept: INTERNAL MEDICINE CLINIC | Facility: CLINIC | Age: 70
End: 2025-02-17
Payer: MEDICARE

## 2025-02-17 VITALS
WEIGHT: 180 LBS | SYSTOLIC BLOOD PRESSURE: 135 MMHG | HEART RATE: 88 BPM | DIASTOLIC BLOOD PRESSURE: 86 MMHG | OXYGEN SATURATION: 98 % | HEIGHT: 70 IN | BODY MASS INDEX: 25.77 KG/M2

## 2025-02-17 DIAGNOSIS — I50.22 SYSTOLIC CHF, CHRONIC (HCC): ICD-10-CM

## 2025-02-17 DIAGNOSIS — I77.811 ABDOMINAL AORTIC ECTASIA: ICD-10-CM

## 2025-02-17 DIAGNOSIS — N18.2 STAGE 2 CHRONIC KIDNEY DISEASE: ICD-10-CM

## 2025-02-17 DIAGNOSIS — N52.9 ERECTILE DYSFUNCTION, UNSPECIFIED ERECTILE DYSFUNCTION TYPE: ICD-10-CM

## 2025-02-17 DIAGNOSIS — E11.59 TYPE 2 DIABETES MELLITUS WITH OTHER CIRCULATORY COMPLICATION, WITHOUT LONG-TERM CURRENT USE OF INSULIN (HCC): Primary | ICD-10-CM

## 2025-02-17 DIAGNOSIS — Z12.11 ENCOUNTER FOR SCREENING FOR COLORECTAL MALIGNANT NEOPLASM: ICD-10-CM

## 2025-02-17 DIAGNOSIS — I73.9 PVD (PERIPHERAL VASCULAR DISEASE): ICD-10-CM

## 2025-02-17 DIAGNOSIS — I48.0 PAROXYSMAL ATRIAL FIBRILLATION (HCC): ICD-10-CM

## 2025-02-17 DIAGNOSIS — E11.59 TYPE 2 DIABETES MELLITUS WITH OTHER CIRCULATORY COMPLICATION, WITHOUT LONG-TERM CURRENT USE OF INSULIN (HCC): ICD-10-CM

## 2025-02-17 DIAGNOSIS — I42.8 NICM (NONISCHEMIC CARDIOMYOPATHY) (HCC): ICD-10-CM

## 2025-02-17 DIAGNOSIS — I10 HTN (HYPERTENSION), BENIGN: ICD-10-CM

## 2025-02-17 DIAGNOSIS — I25.119 ATHEROSCLEROSIS OF NATIVE CORONARY ARTERY OF NATIVE HEART WITH ANGINA PECTORIS: ICD-10-CM

## 2025-02-17 DIAGNOSIS — Z12.12 ENCOUNTER FOR SCREENING FOR COLORECTAL MALIGNANT NEOPLASM: ICD-10-CM

## 2025-02-17 DIAGNOSIS — Z87.891 PERSONAL HISTORY OF TOBACCO USE: ICD-10-CM

## 2025-02-17 DIAGNOSIS — Z72.0 TOBACCO ABUSE DISORDER: ICD-10-CM

## 2025-02-17 DIAGNOSIS — E78.2 MIXED HYPERLIPIDEMIA: ICD-10-CM

## 2025-02-17 LAB
ANION GAP SERPL CALC-SCNC: 9 MMOL/L (ref 7–16)
BASOPHILS # BLD: 0.02 K/UL (ref 0–0.2)
BASOPHILS NFR BLD: 0.4 % (ref 0–2)
BUN SERPL-MCNC: 16 MG/DL (ref 8–23)
CALCIUM SERPL-MCNC: 9.7 MG/DL (ref 8.8–10.2)
CHLORIDE SERPL-SCNC: 105 MMOL/L (ref 98–107)
CO2 SERPL-SCNC: 27 MMOL/L (ref 20–29)
CREAT SERPL-MCNC: 1.26 MG/DL (ref 0.8–1.3)
DIFFERENTIAL METHOD BLD: NORMAL
EOSINOPHIL # BLD: 0.19 K/UL (ref 0–0.8)
EOSINOPHIL NFR BLD: 3.4 % (ref 0.5–7.8)
ERYTHROCYTE [DISTWIDTH] IN BLOOD BY AUTOMATED COUNT: 14.1 % (ref 11.9–14.6)
EST. AVERAGE GLUCOSE BLD GHB EST-MCNC: 137 MG/DL
GLUCOSE SERPL-MCNC: 157 MG/DL (ref 70–99)
HBA1C MFR BLD: 6.4 % (ref 0–5.6)
HCT VFR BLD AUTO: 45.1 % (ref 41.1–50.3)
HGB BLD-MCNC: 15 G/DL (ref 13.6–17.2)
IMM GRANULOCYTES # BLD AUTO: 0.01 K/UL (ref 0–0.5)
IMM GRANULOCYTES NFR BLD AUTO: 0.2 % (ref 0–5)
LYMPHOCYTES # BLD: 1.67 K/UL (ref 0.5–4.6)
LYMPHOCYTES NFR BLD: 30.1 % (ref 13–44)
MCH RBC QN AUTO: 32.4 PG (ref 26.1–32.9)
MCHC RBC AUTO-ENTMCNC: 33.3 G/DL (ref 31.4–35)
MCV RBC AUTO: 97.4 FL (ref 82–102)
MONOCYTES # BLD: 0.53 K/UL (ref 0.1–1.3)
MONOCYTES NFR BLD: 9.5 % (ref 4–12)
NEUTS SEG # BLD: 3.13 K/UL (ref 1.7–8.2)
NEUTS SEG NFR BLD: 56.4 % (ref 43–78)
NRBC # BLD: 0 K/UL (ref 0–0.2)
PLATELET # BLD AUTO: 159 K/UL (ref 150–450)
PMV BLD AUTO: 9.4 FL (ref 9.4–12.3)
POTASSIUM SERPL-SCNC: 4.7 MMOL/L (ref 3.5–5.1)
RBC # BLD AUTO: 4.63 M/UL (ref 4.23–5.6)
SODIUM SERPL-SCNC: 141 MMOL/L (ref 136–145)
WBC # BLD AUTO: 5.6 K/UL (ref 4.3–11.1)

## 2025-02-17 PROCEDURE — G8427 DOCREV CUR MEDS BY ELIG CLIN: HCPCS | Performed by: INTERNAL MEDICINE

## 2025-02-17 PROCEDURE — 1126F AMNT PAIN NOTED NONE PRSNT: CPT | Performed by: INTERNAL MEDICINE

## 2025-02-17 PROCEDURE — G0296 VISIT TO DETERM LDCT ELIG: HCPCS | Performed by: INTERNAL MEDICINE

## 2025-02-17 PROCEDURE — 3017F COLORECTAL CA SCREEN DOC REV: CPT | Performed by: INTERNAL MEDICINE

## 2025-02-17 PROCEDURE — 99214 OFFICE O/P EST MOD 30 MIN: CPT | Performed by: INTERNAL MEDICINE

## 2025-02-17 PROCEDURE — G8419 CALC BMI OUT NRM PARAM NOF/U: HCPCS | Performed by: INTERNAL MEDICINE

## 2025-02-17 PROCEDURE — 1123F ACP DISCUSS/DSCN MKR DOCD: CPT | Performed by: INTERNAL MEDICINE

## 2025-02-17 PROCEDURE — 1159F MED LIST DOCD IN RCRD: CPT | Performed by: INTERNAL MEDICINE

## 2025-02-17 PROCEDURE — 2022F DILAT RTA XM EVC RTNOPTHY: CPT | Performed by: INTERNAL MEDICINE

## 2025-02-17 PROCEDURE — 3046F HEMOGLOBIN A1C LEVEL >9.0%: CPT | Performed by: INTERNAL MEDICINE

## 2025-02-17 PROCEDURE — 3079F DIAST BP 80-89 MM HG: CPT | Performed by: INTERNAL MEDICINE

## 2025-02-17 PROCEDURE — 3075F SYST BP GE 130 - 139MM HG: CPT | Performed by: INTERNAL MEDICINE

## 2025-02-17 PROCEDURE — 4004F PT TOBACCO SCREEN RCVD TLK: CPT | Performed by: INTERNAL MEDICINE

## 2025-02-17 PROCEDURE — G2211 COMPLEX E/M VISIT ADD ON: HCPCS | Performed by: INTERNAL MEDICINE

## 2025-02-17 RX ORDER — TADALAFIL 20 MG/1
20 TABLET ORAL DAILY PRN
Qty: 30 TABLET | Refills: 3 | Status: SHIPPED | OUTPATIENT
Start: 2025-02-17

## 2025-02-17 RX ORDER — LISINOPRIL 5 MG/1
5 TABLET ORAL DAILY
Qty: 90 TABLET | Refills: 1 | Status: SHIPPED | OUTPATIENT
Start: 2025-02-17 | End: 2025-08-16

## 2025-02-17 RX ORDER — AMLODIPINE BESYLATE 10 MG/1
10 TABLET ORAL DAILY
Qty: 90 TABLET | Refills: 1 | Status: SHIPPED | OUTPATIENT
Start: 2025-02-17

## 2025-02-17 RX ORDER — ROSUVASTATIN CALCIUM 20 MG/1
20 TABLET, COATED ORAL NIGHTLY
Qty: 90 TABLET | Refills: 1 | Status: SHIPPED | OUTPATIENT
Start: 2025-02-17

## 2025-02-17 SDOH — ECONOMIC STABILITY: FOOD INSECURITY: WITHIN THE PAST 12 MONTHS, YOU WORRIED THAT YOUR FOOD WOULD RUN OUT BEFORE YOU GOT MONEY TO BUY MORE.: NEVER TRUE

## 2025-02-17 SDOH — ECONOMIC STABILITY: FOOD INSECURITY: WITHIN THE PAST 12 MONTHS, THE FOOD YOU BOUGHT JUST DIDN'T LAST AND YOU DIDN'T HAVE MONEY TO GET MORE.: NEVER TRUE

## 2025-02-17 ASSESSMENT — PATIENT HEALTH QUESTIONNAIRE - PHQ9
SUM OF ALL RESPONSES TO PHQ QUESTIONS 1-9: 0
1. LITTLE INTEREST OR PLEASURE IN DOING THINGS: NOT AT ALL
SUM OF ALL RESPONSES TO PHQ9 QUESTIONS 1 & 2: 0
SUM OF ALL RESPONSES TO PHQ QUESTIONS 1-9: 0
2. FEELING DOWN, DEPRESSED OR HOPELESS: NOT AT ALL

## 2025-02-17 NOTE — PROGRESS NOTES
Carlos Varma D.O.   [place] [address]  Tel: [phone]    Office Visit: Follow Up     Patient Name: Andreas Vazquez   :  1955   MRN:   [id number]      Today's Date: 25 8:31 AM    Subjective     The patient is a 69 y.o.-year-old male who presents for follow-up.    Primary hypertension  -amlodipine 10 mg daily  -Coreg 12.5 mg 2 times daily    Type 2 diabetes mellitus  -Hemoglobin A1c on 2024 was 6.2  -Jardiance 10 mg daily   -He reports he is eating healthy and exercising daily.     CKD stage II  -eGFR from 2024 was >60 with a creatinine of 1.20     Hyperlipidemia  -Crestor 20 mg daily  -lipid panel from 2024 showed total cholesterol 161, HDL 35, LDL of 85, triglycerides of 208, VLDL of 42  -He reports he is eating healthy and exercising daily.      Nonischemic cardiomyopathy/systolic congestive heart failure with AICD  -Last EF 30%  -Coreg 12.5 mg 2 times daily  -Jardiance 10 mg daily   -Patient following with device clinic and cardiology for device checks.  -Patient had follow-up with cardiology on 1/15/2025.  Plan at this time was to continue his current regimen and stressed getting back to exercise.  -Follow up with cardiology 2025.     Coronary artery disease  -History of left heart catheterization in  with moderate CAD, ICD placed in  after an episode of syncope and V. Tach  -LAD stent placement in 2022  -Aspirin 81 mg daily  -Crestor 20 mg daily  -Coreg 12.5 mg twice daily  -Patient last saw cardiology 1/15/2025, plan at this time was to remain on aspirin, statin and beta-blocker and follow-up for angina as needed.  -Follow up with cardiology 2025     Paroxysmal atrial fibrillation  -Coreg 12.5 mg BID  -aspirin 81 mg daily  -Patient saw cardiology on 1/15/2025, no changes made at this time.  -Patient has follow-up with cardiology on 2025     Peripheral vascular disease  -aspirin 81 mg

## 2025-04-25 ENCOUNTER — HOSPITAL ENCOUNTER (EMERGENCY)
Age: 70
Discharge: LEFT BEFORE DISCHARGE | End: 2025-04-25
Attending: EMERGENCY MEDICINE

## 2025-04-25 VITALS
BODY MASS INDEX: 25.77 KG/M2 | DIASTOLIC BLOOD PRESSURE: 79 MMHG | OXYGEN SATURATION: 98 % | RESPIRATION RATE: 19 BRPM | SYSTOLIC BLOOD PRESSURE: 132 MMHG | WEIGHT: 180 LBS | TEMPERATURE: 98 F | HEART RATE: 95 BPM | HEIGHT: 70 IN

## 2025-04-25 ASSESSMENT — PAIN SCALES - GENERAL: PAINLEVEL_OUTOF10: 5

## 2025-04-25 ASSESSMENT — PAIN - FUNCTIONAL ASSESSMENT: PAIN_FUNCTIONAL_ASSESSMENT: 0-10

## 2025-04-25 NOTE — ED TRIAGE NOTES
Patient here to have toenails clipped. Patient states [facility] can not [facility] until June to have them trimmed.

## 2025-04-25 NOTE — ED NOTES
Patient notified that there is low potential for MD to cut toe nails. Patient accepts expectations and states he will be seen and see if he can get the MD to perform toe nail clipping.

## (undated) DEVICE — DESTINATION RENAL GUIDING SHEATH: Brand: DESTINATION

## (undated) DEVICE — ARMADA 35 PTA CATHETER 5 MM X 60 MM X 135 CM / OVER-THE-WIRE: Brand: ARMADA

## (undated) DEVICE — RADIFOCUS GLIDEWIRE ADVANTAGE GUIDEWIRE: Brand: GLIDEWIRE ADVANTAGE

## (undated) DEVICE — GUIDEWIRE VASC IMAG 035INX300CM MOD J TIP STD STEER SLX COAT

## (undated) DEVICE — INTRODUCER SHTH 5FR CANN L11CM GWIRE 0.038IN STD KINK

## (undated) DEVICE — CATHETER ANGIO 5FR L65CM 0.038IN VIS OMNI FLUSH-0 SFT TIP